# Patient Record
Sex: MALE | Race: BLACK OR AFRICAN AMERICAN | NOT HISPANIC OR LATINO | Employment: OTHER | URBAN - METROPOLITAN AREA
[De-identification: names, ages, dates, MRNs, and addresses within clinical notes are randomized per-mention and may not be internally consistent; named-entity substitution may affect disease eponyms.]

---

## 2017-04-08 ENCOUNTER — HOSPITAL ENCOUNTER (EMERGENCY)
Facility: HOSPITAL | Age: 37
Discharge: PSYCHIATRIC OR SUBSTANCE USE FACILITY | End: 2017-04-08
Attending: EMERGENCY MEDICINE
Payer: MEDICARE

## 2017-04-08 VITALS
BODY MASS INDEX: 27.2 KG/M2 | HEIGHT: 70 IN | RESPIRATION RATE: 18 BRPM | HEART RATE: 72 BPM | OXYGEN SATURATION: 99 % | TEMPERATURE: 99 F | DIASTOLIC BLOOD PRESSURE: 97 MMHG | SYSTOLIC BLOOD PRESSURE: 151 MMHG | WEIGHT: 190 LBS

## 2017-04-08 DIAGNOSIS — R45.89 SUICIDAL BEHAVIOR: Primary | ICD-10-CM

## 2017-04-08 LAB
ALBUMIN SERPL BCP-MCNC: 3.6 G/DL
ALP SERPL-CCNC: 125 U/L
ALT SERPL W/O P-5'-P-CCNC: 41 U/L
AMPHET+METHAMPHET UR QL: NEGATIVE
ANION GAP SERPL CALC-SCNC: 8 MMOL/L
APAP SERPL-MCNC: <3 UG/ML
AST SERPL-CCNC: 31 U/L
BARBITURATES UR QL SCN>200 NG/ML: NEGATIVE
BASOPHILS # BLD AUTO: 0.01 K/UL
BASOPHILS NFR BLD: 0.2 %
BENZODIAZ UR QL SCN>200 NG/ML: NEGATIVE
BILIRUB SERPL-MCNC: 0.4 MG/DL
BILIRUB UR QL STRIP: NEGATIVE
BUN SERPL-MCNC: 13 MG/DL
BZE UR QL SCN: NORMAL
CALCIUM SERPL-MCNC: 9 MG/DL
CANNABINOIDS UR QL SCN: NORMAL
CHLORIDE SERPL-SCNC: 106 MMOL/L
CLARITY UR REFRACT.AUTO: CLEAR
CO2 SERPL-SCNC: 26 MMOL/L
COLOR UR AUTO: YELLOW
CREAT SERPL-MCNC: 1.2 MG/DL
CREAT UR-MCNC: 153 MG/DL
DIFFERENTIAL METHOD: ABNORMAL
EOSINOPHIL # BLD AUTO: 0.2 K/UL
EOSINOPHIL NFR BLD: 2.5 %
ERYTHROCYTE [DISTWIDTH] IN BLOOD BY AUTOMATED COUNT: 14.3 %
EST. GFR  (AFRICAN AMERICAN): >60 ML/MIN/1.73 M^2
EST. GFR  (NON AFRICAN AMERICAN): >60 ML/MIN/1.73 M^2
ETHANOL SERPL-MCNC: <10 MG/DL
GLUCOSE SERPL-MCNC: 98 MG/DL
GLUCOSE UR QL STRIP: NEGATIVE
HCT VFR BLD AUTO: 33.6 %
HGB BLD-MCNC: 11 G/DL
HGB UR QL STRIP: NEGATIVE
KETONES UR QL STRIP: NEGATIVE
LEUKOCYTE ESTERASE UR QL STRIP: NEGATIVE
LYMPHOCYTES # BLD AUTO: 3.4 K/UL
LYMPHOCYTES NFR BLD: 58 %
MCH RBC QN AUTO: 26.6 PG
MCHC RBC AUTO-ENTMCNC: 32.7 %
MCV RBC AUTO: 81 FL
METHADONE UR QL SCN>300 NG/ML: NEGATIVE
MONOCYTES # BLD AUTO: 0.4 K/UL
MONOCYTES NFR BLD: 7.3 %
NEUTROPHILS # BLD AUTO: 1.9 K/UL
NEUTROPHILS NFR BLD: 32 %
NITRITE UR QL STRIP: NEGATIVE
OPIATES UR QL SCN: NEGATIVE
PCP UR QL SCN>25 NG/ML: NEGATIVE
PH UR STRIP: 6 [PH] (ref 5–8)
PLATELET # BLD AUTO: 169 K/UL
PLATELET BLD QL SMEAR: ABNORMAL
PMV BLD AUTO: 8.3 FL
POTASSIUM SERPL-SCNC: 3.4 MMOL/L
PROT SERPL-MCNC: 7.4 G/DL
PROT UR QL STRIP: NEGATIVE
RBC # BLD AUTO: 4.14 M/UL
SODIUM SERPL-SCNC: 140 MMOL/L
SP GR UR STRIP: 1.01 (ref 1–1.03)
T4 FREE SERPL-MCNC: 0.91 NG/DL
TOXICOLOGY INFORMATION: NORMAL
TSH SERPL DL<=0.005 MIU/L-ACNC: 7.48 UIU/ML
URN SPEC COLLECT METH UR: NORMAL
UROBILINOGEN UR STRIP-ACNC: NEGATIVE EU/DL
WBC # BLD AUTO: 5.93 K/UL

## 2017-04-08 PROCEDURE — 80307 DRUG TEST PRSMV CHEM ANLYZR: CPT

## 2017-04-08 PROCEDURE — 82570 ASSAY OF URINE CREATININE: CPT

## 2017-04-08 PROCEDURE — 84443 ASSAY THYROID STIM HORMONE: CPT

## 2017-04-08 PROCEDURE — 99285 EMERGENCY DEPT VISIT HI MDM: CPT | Mod: 25

## 2017-04-08 PROCEDURE — 81003 URINALYSIS AUTO W/O SCOPE: CPT

## 2017-04-08 PROCEDURE — 80329 ANALGESICS NON-OPIOID 1 OR 2: CPT

## 2017-04-08 PROCEDURE — 99285 EMERGENCY DEPT VISIT HI MDM: CPT | Mod: ,,, | Performed by: EMERGENCY MEDICINE

## 2017-04-08 PROCEDURE — 85025 COMPLETE CBC W/AUTO DIFF WBC: CPT

## 2017-04-08 PROCEDURE — 80053 COMPREHEN METABOLIC PANEL: CPT

## 2017-04-08 PROCEDURE — 84439 ASSAY OF FREE THYROXINE: CPT

## 2017-04-08 PROCEDURE — 93005 ELECTROCARDIOGRAM TRACING: CPT

## 2017-04-08 PROCEDURE — 93010 ELECTROCARDIOGRAM REPORT: CPT | Mod: ,,, | Performed by: INTERNAL MEDICINE

## 2017-04-08 PROCEDURE — 80320 DRUG SCREEN QUANTALCOHOLS: CPT

## 2017-04-08 RX ORDER — VALPROIC ACID 250 MG/5ML
SOLUTION ORAL 2 TIMES DAILY
Status: ON HOLD | COMMUNITY
End: 2017-12-21 | Stop reason: HOSPADM

## 2017-04-08 RX ORDER — BUDESONIDE AND FORMOTEROL FUMARATE DIHYDRATE 160; 4.5 UG/1; UG/1
2 AEROSOL RESPIRATORY (INHALATION) EVERY 12 HOURS
COMMUNITY

## 2017-04-08 RX ORDER — ESCITALOPRAM OXALATE 10 MG/1
10 TABLET ORAL 2 TIMES DAILY
Status: ON HOLD | COMMUNITY
End: 2017-12-21 | Stop reason: HOSPADM

## 2017-04-08 RX ORDER — FLUPHENAZINE HYDROCHLORIDE 5 MG/1
2.5 TABLET ORAL 2 TIMES DAILY
Status: ON HOLD | COMMUNITY
End: 2017-12-21 | Stop reason: HOSPADM

## 2017-04-08 RX ORDER — FLUPHENAZINE DECANOATE 25 MG/ML
INJECTION, SOLUTION INTRAMUSCULAR; SUBCUTANEOUS
Status: ON HOLD | COMMUNITY
End: 2017-12-21 | Stop reason: HOSPADM

## 2017-04-08 RX ORDER — DICYCLOMINE HYDROCHLORIDE 10 MG/1
10 CAPSULE ORAL 2 TIMES DAILY
Status: ON HOLD | COMMUNITY
End: 2017-12-21 | Stop reason: HOSPADM

## 2017-04-08 NOTE — ED NOTES
SPD here for patient transfer. Trip sheet filled out. Security called. Patient informed of transfer. Patient verbalized understanding.

## 2017-04-08 NOTE — ED NOTES
Pt identifiers Reji Michaels checked and correct. Sitter and security at bedside.   LOC: The patient is awake, alert, aware of environment with an appropriate affect. Oriented x3, speaking appropriately  APPEARANCE: Pt resting comfortably, in no acute distress, pt is clean and well groomed, clothing properly fastened  SKIN: Skin warm, dry and intact, normal skin turgor, moist mucus membranes  RESPIRATORY: Airway is open and patent, respirations are spontaneous, even and unlabored, normal effort and rate  CARDIAC: Normal rate and rhythm, no peripheral edema noted, capillary refill < 3 seconds, bilateral radial pulses 2+  ABDOMEN: Soft, nontender, nondistended. Bowel sounds present   NEUROLOGIC: PERRL, facial expression is symmetrical, patient moving all extremities spontaneously, normal sensation in all extremities when touched with a finger.  Follows all commands appropriately. Pt states he has been having auditory hallucinations, SI, and HI.  MUSCULOSKELETAL: No obvious deformities.

## 2017-04-08 NOTE — ED NOTES
Patient accepted to [facility] per [facility] intake.   Accepting physician Dr. Cortez   Report to be called [phone] ext. [phone] (4th floor)

## 2017-04-08 NOTE — ED PROVIDER NOTES
"Encounter Date: 4/8/2017    SCRIBE #1 NOTE: I, Romeo Hardy, am scribing for, and in the presence of,  Dr. Reeder. I have scribed the following portions of the note - the EKG reading.       History     Chief Complaint   Patient presents with    Suicidal     pt states he has schizophrenia and is hearing voices to hurt himself. pt is compliant with medications without any help.     Review of patient's allergies indicates:   Allergen Reactions    Shellfish containing products Itching and Swelling     HPI Comments: 37 y/o M with extensive Psych history with multiple inpatient stays presents with suicidal, homicidal ideation with auditory and visual hallucinations. Patient recently discharged from [facility], he maintains they changed his prescription and since that time has been unable to keep his eyes open and, "feels like he's driving a car in the dark." He reports hearing voices that tell him to kill himself and hurt other people. He is seeing black/gray lines on the wall. Reports that symptoms are different than prior to his last admission. Denies medication non-compliance but thinks he may have taken one of his medications twice today.     The history is provided by the patient.     Past Medical History:   Diagnosis Date    Anxiety     Asthma     Bipolar 1 disorder     Depression     Hep C w/o coma, chronic     HIV (human immunodeficiency virus infection)     HTN (hypertension)     Schizoaffective disorder, bipolar type      Past Surgical History:   Procedure Laterality Date    APPENDECTOMY      HERNIA REPAIR       Family History   Problem Relation Age of Onset    Hypertension Mother     Stroke Father     Hypertension Father      Social History   Substance Use Topics    Smoking status: Current Every Day Smoker     Packs/day: 1.00     Years: 15.00     Types: Cigarettes    Smokeless tobacco: Never Used    Alcohol use No     Review of Systems   Constitutional: Negative for appetite change, chills and " fatigue.   HENT: Negative for trouble swallowing and voice change.    Eyes: Negative for photophobia, pain and visual disturbance.   Respiratory: Negative for shortness of breath, wheezing and stridor.    Gastrointestinal: Negative for nausea and vomiting.   Neurological: Negative for dizziness, tremors, seizures, syncope, facial asymmetry, speech difficulty, weakness, light-headedness, numbness and headaches.   Psychiatric/Behavioral: Positive for hallucinations, self-injury, sleep disturbance and suicidal ideas. Negative for agitation, behavioral problems, confusion, decreased concentration and dysphoric mood. The patient is not nervous/anxious and is not hyperactive.        Physical Exam   Initial Vitals   BP Pulse Resp Temp SpO2   04/08/17 0121 04/08/17 0121 04/08/17 0121 04/08/17 0121 04/08/17 0121   161/89 75 16 98.5 °F (36.9 °C) 98 %     Physical Exam    Nursing note and vitals reviewed.  Constitutional: He appears well-developed and well-nourished. He is not diaphoretic. No distress.   HENT:   Head: Normocephalic.   Eyes: Pupils are equal, round, and reactive to light. Right eye exhibits nystagmus. Left eye exhibits nystagmus.   Horizontal nystagmus at rest.    Neck: Normal range of motion.   Cardiovascular: Normal rate, regular rhythm, normal heart sounds and intact distal pulses.   No murmur heard.  Pulmonary/Chest: Breath sounds normal. No respiratory distress. He has no wheezes. He has no rhonchi. He has no rales. He exhibits no tenderness.   Abdominal: Soft. Bowel sounds are normal. He exhibits no distension.   Neurological: He is alert. He has normal reflexes.   Skin: Skin is warm and dry. No rash and no abscess noted. No erythema. No pallor.   Psychiatric: His mood appears not anxious. His affect is not angry, not blunt, not labile and not inappropriate. His speech is slurred. His speech is not rapid and/or pressured and not delayed. He is actively hallucinating. He is not agitated, not aggressive,  not hyperactive, not slowed, not withdrawn and not combative. Thought content is not paranoid and not delusional. He does not exhibit a depressed mood. He expresses homicidal and suicidal ideation. He is communicative. He is attentive.         ED Course   Procedures  Labs Reviewed   COMPREHENSIVE METABOLIC PANEL   TSH   CBC W/ AUTO DIFFERENTIAL   URINALYSIS   DRUG SCREEN PANEL, URINE EMERGENCY   ALCOHOL,MEDICAL (ETHANOL)   ACETAMINOPHEN LEVEL     EKG Readings: (Independently Interpreted)   Initial Reading: No STEMI. Rhythm: Normal Sinus Rhythm.   NSR, no ST abnormalities, normal intervals.           Medical Decision Making:   History:   Old Medical Records: I decided to obtain old medical records.  Initial Assessment:   37 y/o M w/ extensive psych history presents with homicidal/suicidal ideation + auditory + visual hallucinations. Well, medically clear and transfer to [facility], no psych beds currently availab.e   Independently Interpreted Test(s):   I have ordered and independently interpreted EKG Reading(s) - see prior notes  Clinical Tests:   Lab Tests: Ordered and Reviewed  Medical Tests: Ordered and Reviewed            Scribe Attestation:   Scribe #1: I performed the above scribed service and the documentation accurately describes the services I performed. I attest to the accuracy of the note.    Attending Attestation:   Physician Attestation Statement for Resident:  As the supervising MD   Physician Attestation Statement: I have personally seen and examined this patient.   I agree with the above history. -: 35 yo m, psych hx, here with SI/HI  PEC  Medical clearance  Psych admit   As the supervising MD I agree with the above PE.    As the supervising MD I agree with the above treatment, course, plan, and disposition.  I have reviewed and agree with the residents interpretation of the following: lab data.  I have reviewed the following: old records at this facility.          Physician Attestation for  Scribe:  Physician Attestation Statement for Scribe #1: I, Dr. Reeder, reviewed documentation, as scribed by Romeo Hardy in my presence, and it is both accurate and complete.                 ED Course     4:37 AM  Pt medically cleared for psych admit  PEC done  Will transfer to inpatient psych facility when available    Clinical Impression:   The encounter diagnosis was Suicidal behavior.          Jaden Gary MD  Resident  04/08/17 0657       Judy Reeder MD  04/11/17 3382

## 2017-04-08 NOTE — ED NOTES
Actively seeking placement at this time. [facility] [place], [facility], [facility], [facility], and [facility].

## 2017-12-15 ENCOUNTER — HOSPITAL ENCOUNTER (EMERGENCY)
Facility: HOSPITAL | Age: 37
Discharge: PSYCHIATRIC OR SUBSTANCE USE FACILITY | End: 2017-12-15
Attending: FAMILY MEDICINE
Payer: MEDICARE

## 2017-12-15 ENCOUNTER — HOSPITAL ENCOUNTER (INPATIENT)
Facility: HOSPITAL | Age: 37
LOS: 6 days | Discharge: HOME | DRG: 885 | End: 2017-12-21
Attending: PSYCHIATRY & NEUROLOGY | Admitting: PSYCHIATRY & NEUROLOGY
Payer: MEDICARE

## 2017-12-15 VITALS
DIASTOLIC BLOOD PRESSURE: 75 MMHG | SYSTOLIC BLOOD PRESSURE: 120 MMHG | RESPIRATION RATE: 16 BRPM | BODY MASS INDEX: 24.52 KG/M2 | WEIGHT: 185 LBS | HEIGHT: 73 IN | HEART RATE: 75 BPM | OXYGEN SATURATION: 98 % | TEMPERATURE: 98 F

## 2017-12-15 DIAGNOSIS — F12.10 CANNABIS ABUSE: Chronic | ICD-10-CM

## 2017-12-15 DIAGNOSIS — F14.10 COCAINE ABUSE: ICD-10-CM

## 2017-12-15 DIAGNOSIS — F25.9 SCHIZOAFFECTIVE DISORDER, CHRONIC CONDITION WITH ACUTE EXACERBATION: Primary | Chronic | ICD-10-CM

## 2017-12-15 DIAGNOSIS — R44.3 HALLUCINATIONS: ICD-10-CM

## 2017-12-15 DIAGNOSIS — F31.12 BIPOLAR 1 DISORDER, MANIC, MODERATE: ICD-10-CM

## 2017-12-15 DIAGNOSIS — F25.9 SCHIZOPHRENIA, SCHIZOAFFECTIVE, CHRONIC: Chronic | ICD-10-CM

## 2017-12-15 DIAGNOSIS — B18.2 HEP C W/O COMA, CHRONIC: ICD-10-CM

## 2017-12-15 DIAGNOSIS — R45.850 HOMICIDAL IDEATION: ICD-10-CM

## 2017-12-15 DIAGNOSIS — Z72.0 TOBACCO USE: ICD-10-CM

## 2017-12-15 DIAGNOSIS — R06.2 WHEEZING: ICD-10-CM

## 2017-12-15 DIAGNOSIS — I10 HYPERTENSION, UNSPECIFIED TYPE: ICD-10-CM

## 2017-12-15 DIAGNOSIS — B20 HIV (HUMAN IMMUNODEFICIENCY VIRUS INFECTION): ICD-10-CM

## 2017-12-15 DIAGNOSIS — F20.0 PARANOID SCHIZOPHRENIA: Primary | ICD-10-CM

## 2017-12-15 LAB
ALBUMIN SERPL BCP-MCNC: 4.5 G/DL
ALP SERPL-CCNC: 107 U/L
ALT SERPL W/O P-5'-P-CCNC: 76 U/L
AMPHET+METHAMPHET UR QL: NEGATIVE
ANION GAP SERPL CALC-SCNC: 12 MMOL/L
APAP SERPL-MCNC: <10 UG/ML
AST SERPL-CCNC: 57 U/L
BARBITURATES UR QL SCN>200 NG/ML: NEGATIVE
BASOPHILS # BLD AUTO: 0.02 K/UL
BASOPHILS NFR BLD: 0.5 %
BENZODIAZ UR QL SCN>200 NG/ML: NEGATIVE
BILIRUB SERPL-MCNC: 0.3 MG/DL
BILIRUB UR QL STRIP: NEGATIVE
BUN SERPL-MCNC: 20 MG/DL
BZE UR QL SCN: NEGATIVE
CALCIUM SERPL-MCNC: 9.2 MG/DL
CANNABINOIDS UR QL SCN: NORMAL
CHLORIDE SERPL-SCNC: 102 MMOL/L
CLARITY UR REFRACT.AUTO: CLEAR
CO2 SERPL-SCNC: 27 MMOL/L
COLOR UR AUTO: YELLOW
CREAT SERPL-MCNC: 1.1 MG/DL
CREAT UR-MCNC: 126.8 MG/DL
DIFFERENTIAL METHOD: ABNORMAL
EOSINOPHIL # BLD AUTO: 0.1 K/UL
EOSINOPHIL NFR BLD: 2.7 %
ERYTHROCYTE [DISTWIDTH] IN BLOOD BY AUTOMATED COUNT: 13.6 %
EST. GFR  (AFRICAN AMERICAN): >60 ML/MIN/1.73 M^2
EST. GFR  (NON AFRICAN AMERICAN): >60 ML/MIN/1.73 M^2
ETHANOL SERPL-MCNC: <10 MG/DL
GLUCOSE SERPL-MCNC: 126 MG/DL
GLUCOSE UR QL STRIP: NEGATIVE
HCT VFR BLD AUTO: 34.7 %
HGB BLD-MCNC: 11.8 G/DL
HGB UR QL STRIP: NEGATIVE
KETONES UR QL STRIP: NEGATIVE
LEUKOCYTE ESTERASE UR QL STRIP: NEGATIVE
LYMPHOCYTES # BLD AUTO: 2.8 K/UL
LYMPHOCYTES NFR BLD: 62.4 %
MCH RBC QN AUTO: 27.1 PG
MCHC RBC AUTO-ENTMCNC: 34 G/DL
MCV RBC AUTO: 80 FL
METHADONE UR QL SCN>300 NG/ML: NEGATIVE
MONOCYTES # BLD AUTO: 0.3 K/UL
MONOCYTES NFR BLD: 7.7 %
NEUTROPHILS # BLD AUTO: 1.2 K/UL
NEUTROPHILS NFR BLD: 26.7 %
NITRITE UR QL STRIP: NEGATIVE
OPIATES UR QL SCN: NEGATIVE
PCP UR QL SCN>25 NG/ML: NEGATIVE
PH UR STRIP: 7 [PH] (ref 5–8)
PLATELET # BLD AUTO: 220 K/UL
PMV BLD AUTO: 8.6 FL
POTASSIUM SERPL-SCNC: 3.5 MMOL/L
PROT SERPL-MCNC: 8.2 G/DL
PROT UR QL STRIP: NEGATIVE
RBC # BLD AUTO: 4.35 M/UL
SODIUM SERPL-SCNC: 141 MMOL/L
SP GR UR STRIP: 1.01 (ref 1–1.03)
TOXICOLOGY INFORMATION: NORMAL
URN SPEC COLLECT METH UR: NORMAL
UROBILINOGEN UR STRIP-ACNC: 1 EU/DL
WBC # BLD AUTO: 4.41 K/UL

## 2017-12-15 PROCEDURE — 80053 COMPREHEN METABOLIC PANEL: CPT

## 2017-12-15 PROCEDURE — 25000003 PHARM REV CODE 250: Performed by: FAMILY MEDICINE

## 2017-12-15 PROCEDURE — G0425 INPT/ED TELECONSULT30: HCPCS | Mod: GT,,, | Performed by: PSYCHIATRY & NEUROLOGY

## 2017-12-15 PROCEDURE — 12400001 HC PSYCH SEMI-PRIVATE ROOM

## 2017-12-15 PROCEDURE — 85025 COMPLETE CBC W/AUTO DIFF WBC: CPT

## 2017-12-15 PROCEDURE — 99285 EMERGENCY DEPT VISIT HI MDM: CPT

## 2017-12-15 PROCEDURE — 81003 URINALYSIS AUTO W/O SCOPE: CPT

## 2017-12-15 PROCEDURE — 80329 ANALGESICS NON-OPIOID 1 OR 2: CPT

## 2017-12-15 PROCEDURE — 80320 DRUG SCREEN QUANTALCOHOLS: CPT

## 2017-12-15 PROCEDURE — 80307 DRUG TEST PRSMV CHEM ANLYZR: CPT

## 2017-12-15 RX ORDER — POLYETHYLENE GLYCOL 3350 17 G/17G
17 POWDER, FOR SOLUTION ORAL DAILY
Status: DISCONTINUED | OUTPATIENT
Start: 2017-12-16 | End: 2017-12-19

## 2017-12-15 RX ORDER — LOSARTAN POTASSIUM 25 MG/1
50 TABLET ORAL DAILY
Status: DISCONTINUED | OUTPATIENT
Start: 2017-12-16 | End: 2017-12-21 | Stop reason: HOSPADM

## 2017-12-15 RX ORDER — RISPERIDONE 1 MG/1
1 TABLET ORAL 2 TIMES DAILY
Status: DISCONTINUED | OUTPATIENT
Start: 2017-12-15 | End: 2017-12-15 | Stop reason: HOSPADM

## 2017-12-15 RX ORDER — HALOPERIDOL 5 MG/1
5 TABLET ORAL EVERY 4 HOURS PRN
Status: DISCONTINUED | OUTPATIENT
Start: 2017-12-15 | End: 2017-12-21 | Stop reason: HOSPADM

## 2017-12-15 RX ORDER — ALPRAZOLAM 1 MG/1
1 TABLET ORAL
Status: COMPLETED | OUTPATIENT
Start: 2017-12-15 | End: 2017-12-15

## 2017-12-15 RX ORDER — EFAVIRENZ, EMTRICITABINE AND TENOFOVIR DISOPROXIL FUMARATE 600; 200; 300 MG/1; MG/1; MG/1
1 TABLET, FILM COATED ORAL NIGHTLY
Status: DISCONTINUED | OUTPATIENT
Start: 2017-12-15 | End: 2017-12-16

## 2017-12-15 RX ORDER — DOCUSATE SODIUM 100 MG/1
100 CAPSULE, LIQUID FILLED ORAL DAILY PRN
Status: DISCONTINUED | OUTPATIENT
Start: 2017-12-15 | End: 2017-12-21 | Stop reason: HOSPADM

## 2017-12-15 RX ORDER — FLUTICASONE PROPIONATE 50 MCG
1 SPRAY, SUSPENSION (ML) NASAL DAILY
Status: DISCONTINUED | OUTPATIENT
Start: 2017-12-16 | End: 2017-12-21 | Stop reason: HOSPADM

## 2017-12-15 RX ORDER — DIPHENHYDRAMINE HCL 50 MG
50 CAPSULE ORAL EVERY 4 HOURS PRN
Status: DISCONTINUED | OUTPATIENT
Start: 2017-12-15 | End: 2017-12-21 | Stop reason: HOSPADM

## 2017-12-15 RX ORDER — ALBUTEROL SULFATE 90 UG/1
2 AEROSOL, METERED RESPIRATORY (INHALATION) EVERY 4 HOURS PRN
Status: DISCONTINUED | OUTPATIENT
Start: 2017-12-15 | End: 2017-12-21 | Stop reason: HOSPADM

## 2017-12-15 RX ORDER — CALCIUM CARBONATE 200(500)MG
1000 TABLET,CHEWABLE ORAL EVERY 8 HOURS PRN
Status: DISCONTINUED | OUTPATIENT
Start: 2017-12-15 | End: 2017-12-21 | Stop reason: HOSPADM

## 2017-12-15 RX ORDER — HYDROXYZINE PAMOATE 50 MG/1
50 CAPSULE ORAL NIGHTLY PRN
Status: DISCONTINUED | OUTPATIENT
Start: 2017-12-15 | End: 2017-12-21 | Stop reason: HOSPADM

## 2017-12-15 RX ORDER — FOLIC ACID 1 MG/1
1 TABLET ORAL DAILY
Status: DISCONTINUED | OUTPATIENT
Start: 2017-12-16 | End: 2017-12-21 | Stop reason: HOSPADM

## 2017-12-15 RX ORDER — PALIPERIDONE 6 MG/1
3 TABLET, EXTENDED RELEASE ORAL DAILY
Status: ON HOLD | COMMUNITY
End: 2017-12-21 | Stop reason: HOSPADM

## 2017-12-15 RX ORDER — DIPHENHYDRAMINE HYDROCHLORIDE 50 MG/ML
50 INJECTION INTRAMUSCULAR; INTRAVENOUS EVERY 4 HOURS PRN
Status: DISCONTINUED | OUTPATIENT
Start: 2017-12-15 | End: 2017-12-21 | Stop reason: HOSPADM

## 2017-12-15 RX ORDER — HALOPERIDOL 5 MG/ML
5 INJECTION INTRAMUSCULAR EVERY 4 HOURS PRN
Status: DISCONTINUED | OUTPATIENT
Start: 2017-12-15 | End: 2017-12-21 | Stop reason: HOSPADM

## 2017-12-15 RX ORDER — PANTOPRAZOLE SODIUM 40 MG/1
40 TABLET, DELAYED RELEASE ORAL DAILY
Status: DISCONTINUED | OUTPATIENT
Start: 2017-12-16 | End: 2017-12-21 | Stop reason: HOSPADM

## 2017-12-15 RX ORDER — LORAZEPAM 1 MG/1
2 TABLET ORAL EVERY 4 HOURS PRN
Status: DISCONTINUED | OUTPATIENT
Start: 2017-12-15 | End: 2017-12-21 | Stop reason: HOSPADM

## 2017-12-15 RX ORDER — ACETAMINOPHEN 325 MG/1
650 TABLET ORAL EVERY 6 HOURS PRN
Status: DISCONTINUED | OUTPATIENT
Start: 2017-12-15 | End: 2017-12-21 | Stop reason: HOSPADM

## 2017-12-15 RX ORDER — FLUTICASONE FUROATE AND VILANTEROL 100; 25 UG/1; UG/1
1 POWDER RESPIRATORY (INHALATION) DAILY
Status: DISCONTINUED | OUTPATIENT
Start: 2017-12-16 | End: 2017-12-21 | Stop reason: HOSPADM

## 2017-12-15 RX ADMIN — ALPRAZOLAM 1 MG: 1 TABLET ORAL at 06:12

## 2017-12-15 NOTE — ED PROVIDER NOTES
"Encounter Date: 12/15/2017       History     Chief Complaint   Patient presents with    Psychiatric Evaluation     Pt states was on transport van on way home from [facility] outpatient therapy when he started hearing voices, states the voices told him "to hurt somebody", states "I live with that person and I don't want to go to long term so I need to be PEC'd."  Pt denies SI.  States has had inpatient treatment "in and out since 2008"  states last in patient treatment was 4/17.  pt calm and cooperative at this time.      Patient claims that he is hearing voices to kill his stepdad.  He lives with his stepsister and landed her $600 to buy a new car.  His step father wanted him to leave the house.  He wanted his sister to give back his money, as she did not give he is threatening to kill his stepdad.  [facility] after [facility] and decided to [facility] to [facility] but is sent to ER for evaluation.  Patient has history of bipolar, schizophrenia, depression, anxiety.      The history is provided by the patient.     Review of patient's allergies indicates:   Allergen Reactions    Shellfish containing products Itching and Swelling     Past Medical History:   Diagnosis Date    Anxiety     Asthma     Bipolar 1 disorder     Depression     Hep C w/o coma, chronic     HIV (human immunodeficiency virus infection)     HTN (hypertension)     Schizoaffective disorder, bipolar type      Past Surgical History:   Procedure Laterality Date    APPENDECTOMY      HERNIA REPAIR       Family History   Problem Relation Age of Onset    Hypertension Mother     Stroke Father     Hypertension Father      Social History   Substance Use Topics    Smoking status: Current Every Day Smoker     Packs/day: 1.00     Years: 15.00     Types: Cigarettes    Smokeless tobacco: Never Used    Alcohol use No     Review of Systems   Constitutional: Negative for activity change, appetite change and fever. "   HENT: Negative for congestion, ear discharge, ear pain, rhinorrhea, sinus pain and sore throat.    Eyes: Negative for pain, discharge, redness and itching.   Respiratory: Negative for cough, chest tightness, shortness of breath and wheezing.    Cardiovascular: Negative for chest pain.   Gastrointestinal: Negative for abdominal distention, abdominal pain, diarrhea and vomiting.   Genitourinary: Negative for dysuria, flank pain and frequency.   Musculoskeletal: Negative for back pain, neck pain and neck stiffness.   Skin: Negative for rash.   Neurological: Negative for dizziness, tremors, syncope, speech difficulty, weakness, light-headedness and headaches.   Psychiatric/Behavioral: Positive for behavioral problems and hallucinations. Negative for confusion and decreased concentration. The patient is nervous/anxious.    All other systems reviewed and are negative.      Physical Exam     Initial Vitals [12/15/17 1640]   BP Pulse Resp Temp SpO2   131/71 85 18 98.7 °F (37.1 °C) 98 %      MAP       91         Physical Exam    Nursing note and vitals reviewed.  Constitutional: He appears well-developed and well-nourished.   HENT:   Head: Normocephalic.   Right Ear: External ear normal.   Left Ear: External ear normal.   Nose: Nose normal.   Mouth/Throat: Oropharynx is clear and moist.   Eyes: Conjunctivae and EOM are normal. Pupils are equal, round, and reactive to light.   Neck: Normal range of motion. Neck supple.   Cardiovascular: Normal rate, regular rhythm, normal heart sounds and intact distal pulses.   Pulmonary/Chest: Breath sounds normal. No respiratory distress. He has no wheezes. He has no rhonchi. He has no rales. He exhibits no tenderness.   Abdominal: Soft. Bowel sounds are normal. He exhibits no distension. There is no tenderness. There is no guarding.   Musculoskeletal: Normal range of motion.   Neurological: He is alert and oriented to person, place, and time. He has normal strength and normal reflexes.  He displays normal reflexes. No cranial nerve deficit.   Skin: Skin is warm. Capillary refill takes less than 2 seconds. No rash noted. No erythema.   Psychiatric: His mood appears anxious. His affect is labile. His speech is rapid and/or pressured. He is aggressive. Thought content is paranoid. Cognition and memory are normal. He expresses impulsivity. He expresses homicidal ideation. He expresses homicidal plans.         ED Course   Procedures  Labs Reviewed   CBC W/ AUTO DIFFERENTIAL   COMPREHENSIVE METABOLIC PANEL   URINALYSIS   DRUG SCREEN PANEL, URINE EMERGENCY   ALCOHOL,MEDICAL (ETHANOL)   ACETAMINOPHEN LEVEL                               ED Course     patient is medically cleared for psych inpatient placement.  Clinical Impression:   The primary encounter diagnosis was Paranoid schizophrenia. Diagnoses of Homicidal ideation and Bipolar 1 disorder, manic, moderate were also pertinent to this visit.                           Uche Evans MD  12/15/17 1800

## 2017-12-16 PROBLEM — F12.10 CANNABIS ABUSE: Chronic | Status: ACTIVE | Noted: 2017-12-16

## 2017-12-16 PROBLEM — Z72.811: Status: ACTIVE | Noted: 2017-12-16

## 2017-12-16 PROBLEM — Z72.811: Status: RESOLVED | Noted: 2017-12-16 | Resolved: 2017-12-16

## 2017-12-16 PROBLEM — R44.3 HALLUCINATIONS: Status: RESOLVED | Noted: 2017-12-15 | Resolved: 2017-12-16

## 2017-12-16 LAB
CHOLEST SERPL-MCNC: 154 MG/DL
CHOLEST/HDLC SERPL: 4.3 {RATIO}
ESTIMATED AVG GLUCOSE: 105 MG/DL
HBA1C MFR BLD HPLC: 5.3 %
HDLC SERPL-MCNC: 36 MG/DL
HDLC SERPL: 23.4 %
LDLC SERPL CALC-MCNC: 62 MG/DL
NONHDLC SERPL-MCNC: 118 MG/DL
RPR SER QL: NORMAL
TRIGL SERPL-MCNC: 280 MG/DL

## 2017-12-16 PROCEDURE — 87591 N.GONORRHOEAE DNA AMP PROB: CPT

## 2017-12-16 PROCEDURE — 12400001 HC PSYCH SEMI-PRIVATE ROOM

## 2017-12-16 PROCEDURE — 80074 ACUTE HEPATITIS PANEL: CPT

## 2017-12-16 PROCEDURE — 80061 LIPID PANEL: CPT

## 2017-12-16 PROCEDURE — 25000003 PHARM REV CODE 250: Performed by: PSYCHIATRY & NEUROLOGY

## 2017-12-16 PROCEDURE — 25000242 PHARM REV CODE 250 ALT 637 W/ HCPCS: Performed by: PSYCHIATRY & NEUROLOGY

## 2017-12-16 PROCEDURE — 36415 COLL VENOUS BLD VENIPUNCTURE: CPT

## 2017-12-16 PROCEDURE — 83036 HEMOGLOBIN GLYCOSYLATED A1C: CPT

## 2017-12-16 PROCEDURE — 99223 1ST HOSP IP/OBS HIGH 75: CPT | Mod: AI,,, | Performed by: PSYCHIATRY & NEUROLOGY

## 2017-12-16 PROCEDURE — 86592 SYPHILIS TEST NON-TREP QUAL: CPT

## 2017-12-16 RX ORDER — PAROXETINE HYDROCHLORIDE 20 MG/1
20 TABLET, FILM COATED ORAL DAILY
Status: DISCONTINUED | OUTPATIENT
Start: 2017-12-16 | End: 2017-12-16

## 2017-12-16 RX ORDER — LORAZEPAM 1 MG/1
2 TABLET ORAL EVERY 4 HOURS PRN
Status: DISCONTINUED | OUTPATIENT
Start: 2017-12-16 | End: 2017-12-16

## 2017-12-16 RX ORDER — ALPRAZOLAM 1 MG/1
1 TABLET ORAL 3 TIMES DAILY
Status: DISCONTINUED | OUTPATIENT
Start: 2017-12-16 | End: 2017-12-18

## 2017-12-16 RX ORDER — PALIPERIDONE 6 MG/1
6 TABLET, EXTENDED RELEASE ORAL DAILY
Status: DISCONTINUED | OUTPATIENT
Start: 2017-12-16 | End: 2017-12-21 | Stop reason: HOSPADM

## 2017-12-16 RX ORDER — QUETIAPINE FUMARATE 100 MG/1
100 TABLET, FILM COATED ORAL ONCE
Status: COMPLETED | OUTPATIENT
Start: 2017-12-16 | End: 2017-12-16

## 2017-12-16 RX ORDER — PAROXETINE 30 MG/1
30 TABLET, FILM COATED ORAL DAILY
Status: DISCONTINUED | OUTPATIENT
Start: 2017-12-16 | End: 2017-12-21 | Stop reason: HOSPADM

## 2017-12-16 RX ORDER — QUETIAPINE FUMARATE 100 MG/1
100 TABLET, FILM COATED ORAL NIGHTLY
Status: DISCONTINUED | OUTPATIENT
Start: 2017-12-16 | End: 2017-12-16

## 2017-12-16 RX ORDER — QUETIAPINE FUMARATE 100 MG/1
100 TABLET, FILM COATED ORAL NIGHTLY
Status: DISCONTINUED | OUTPATIENT
Start: 2017-12-17 | End: 2017-12-16

## 2017-12-16 RX ADMIN — FLUTICASONE PROPIONATE 1 SPRAY: 50 SPRAY, METERED NASAL at 08:12

## 2017-12-16 RX ADMIN — CALCIUM CARBONATE (ANTACID) CHEW TAB 500 MG 1000 MG: 500 CHEW TAB at 03:12

## 2017-12-16 RX ADMIN — LOSARTAN POTASSIUM 50 MG: 25 TABLET, FILM COATED ORAL at 08:12

## 2017-12-16 RX ADMIN — THERA TABS 1 TABLET: TAB at 08:12

## 2017-12-16 RX ADMIN — ALPRAZOLAM 1 MG: 1 TABLET ORAL at 11:12

## 2017-12-16 RX ADMIN — QUETIAPINE FUMARATE 100 MG: 100 TABLET, FILM COATED ORAL at 03:12

## 2017-12-16 RX ADMIN — PALIPERIDONE 6 MG: 6 TABLET, EXTENDED RELEASE ORAL at 06:12

## 2017-12-16 RX ADMIN — FLUTICASONE FUROATE AND VILANTEROL TRIFENATATE 1 PUFF: 100; 25 POWDER RESPIRATORY (INHALATION) at 08:12

## 2017-12-16 RX ADMIN — FOLIC ACID 1 MG: 1 TABLET ORAL at 08:12

## 2017-12-16 RX ADMIN — PAROXETINE HYDROCHLORIDE HEMIHYDRATE 30 MG: 30 TABLET, FILM COATED ORAL at 08:12

## 2017-12-16 RX ADMIN — PANTOPRAZOLE SODIUM 40 MG: 40 TABLET, DELAYED RELEASE ORAL at 08:12

## 2017-12-16 RX ADMIN — ALPRAZOLAM 1 MG: 1 TABLET ORAL at 08:12

## 2017-12-16 NOTE — ASSESSMENT & PLAN NOTE
Reports Compliance with Invega Sustenna 156 mg IM last received 12/3  Reports AH despite no objective signs of psychosis  R/O Borderline Personality Disorder as clouding diagnosis  Seroquel 100 mg qhs started 12/16 for mood and sleep   Continue Paxil 30 mg daily   Haldol + Ativan PRN non redirectable agitation  Lipid panel + HbA1c pending

## 2017-12-16 NOTE — PLAN OF CARE
Problem: Patient Care Overview  Goal: Plan of Care Review  Outcome: Ongoing (interventions implemented as appropriate)  Please see previous note for further information. Patient has been sleeping since admit. No management issues. Environmental and safety rounds performed. Will continue to monitor.

## 2017-12-16 NOTE — HOSPITAL COURSE
"12/17/17 - patient irritable, paranoid but so far under good behavioral control.  Continues to voice SI/HI towards family.  Compliant with med regimen.  12/18/2017 - The pt describes his mood as "not good." He notes persistent homicidal and suicidal thoughts. He states that he wants to "kick their ass" of his step-father and step-sister (Kyle Nair and Andreas Nair.) We discuss the pt's h/o substance abuse, he describes himself as a polysubstance abuser with no DOC because "I'd use them all." The pt notes active paranoia and fear that people are "out to get me." He adds that he does not feel safe on the unit. Pt is reassured that he is on a safe unit.   12/19/2017 - The pt notes that he began to feel depressed yesterday and his SI increased. The pt notes that he continues to to have thoughts of harming his family. He notes "If I get out of here I'm gonna kick their ass." He notes that he understands that if he does hurt his family he will go to [facility].  Per staff he is solicitous of xanax - also with treatment team this AM.  12/20/2017 - The pt notes that he spoke to his sister Andreas and that he wants to go home for the holidays. He formerly wanted to harm his sister and now states that he gets "rebelious" when he doesn't get his way. He notes that she wouldn't take him to somewhere he wanted to go and he got angry and wanted to harm her and her dad. He now states that he has no desire to harm them. He adds that he was hallucinating formerly and now is no longer hearing voices. He notes that these voices "came all of a sudden and stopped all of a sudden."   12/21/2017 - The pt notes that he is no longer suffering from SI, HI, AVH or paranoia. He continues to note that his symptoms disappear and re-appear. He is aware that his sister is concerned that he was formerly threatening suicide and homicide toward her and her father.   Sister Andreas [phone]- Spoke with patient since our last conversation. She " reports that the patient is safe to return home to stay with her. Her father was also made aware of some of the comments that the patient made and they do not feel like he is a danger to them or himself at this time. The patient will need to have transportation to her home as she still does not have a car to come to the hospital.

## 2017-12-16 NOTE — PSYCH
Patient escorted to the unit via security at approximately 2200. Patient searched for contraband, none noted. Personal belongings searched and placed in patient specific locker; patient signature obtained for belongings inventory. VSS. JAVED called into . Patient appropriate during conversation. Appeared slightly distracted but did not appear to be responding to internal stimuli. He does endorse hearing voices that are telling him to kill his younger sister and step-father, and then to kill himself. He stated that while he himself does not have these feelings, that when he is hearing voices he is unable to control himself. Patient also stated that he does not think his current dose of invega is working and that is primary concern is staying on his xanax and increasing his invega injection. Patient oriented to room and unit. Admit paper work signed and questions answered. Currently patient is sleeping in his bed. Will continue to monitor.

## 2017-12-16 NOTE — SUBJECTIVE & OBJECTIVE
Patient History           Medical as of 12/16/2017     Past Medical History     Diagnosis Date Comments Source    Anxiety -- -- Provider    Asthma -- -- Provider    Bipolar 1 disorder -- -- Provider    Depression -- -- Provider    Hep C w/o coma, chronic -- -- Provider    HIV (human immunodeficiency virus infection) -- -- Provider    HTN (hypertension) -- -- Provider    Schizoaffective disorder, bipolar type -- -- Provider                  Surgical as of 12/16/2017     Past Surgical History     Procedure Laterality Date Comments Source    APPENDECTOMY -- -- -- Provider    HERNIA REPAIR -- -- -- Provider                  Family as of 12/16/2017     Problem Relation Name Age of Onset Comments Source    Hypertension Mother -- -- -- Provider    Stroke Father -- -- -- Provider    Hypertension Father -- -- -- Provider            Tobacco Use as of 12/16/2017     Smoking Status Smoking Start Date Smoking Quit Date Packs/day Years Used    Current Every Day Smoker -- -- 1.00 15.00    Types Comments Smokeless Tobacco Status Smokeless Tobacco Quit Date Source     Cigarettes -- Never Used -- Provider            Alcohol Use as of 12/16/2017     Alcohol Use Drinks/Week Alcohol/Week Comments Source    No -- -- -- Provider            Drug Use as of 12/16/2017     Drug Use Types Frequency Comments Source    Yes  Marijuana -- -- Provider            Sexual Activity as of 12/16/2017     Sexually Active Birth Control Partners Comments Source    Not Asked -- -- -- Provider            Activities of Daily Living as of 12/16/2017    **None**           Social Documentation as of 12/16/2017    **None**           Occupational as of 12/16/2017    **None**           Socioeconomic as of 12/16/2017     Marital Status Spouse Name Number of Children Years Education Preferred Language Ethnicity Race Source    Single -- -- -- English /Black Black or  --         Pertinent History Q A Comments    as of 12/16/2017  Lives with      Place in Birth Order      Lives in      Number of Siblings      Raised by      Legal Involvement      Childhood Trauma      Criminal History of      Financial Status      Highest Level of Education      Does patient have access to a firearm?       Service      Primary Leisure Activity      Spirituality       Past Medical History:   Diagnosis Date    Anxiety     Asthma     Bipolar 1 disorder     Depression     Hep C w/o coma, chronic     HIV (human immunodeficiency virus infection)     HTN (hypertension)     Schizoaffective disorder, bipolar type      Past Surgical History:   Procedure Laterality Date    APPENDECTOMY      HERNIA REPAIR       Family History     Problem Relation (Age of Onset)    Hypertension Mother, Father    Stroke Father        Social History Main Topics    Smoking status: Current Every Day Smoker     Packs/day: 1.00     Years: 15.00     Types: Cigarettes    Smokeless tobacco: Never Used    Alcohol use No    Drug use: Yes     Types: Marijuana    Sexual activity: Not on file     Review of patient's allergies indicates:   Allergen Reactions    Shellfish containing products Itching and Swelling       Current Facility-Administered Medications on File Prior to Encounter   Medication    [COMPLETED] ALPRAZolam tablet 1 mg    [DISCONTINUED] risperiDONE tablet 1 mg     Current Outpatient Prescriptions on File Prior to Encounter   Medication Sig    albuterol (PROAIR HFA) 90 mcg/actuation inhaler Inhale 2 puffs into the lungs every 4 (four) hours as needed for Wheezing.    alprazolam (XANAX) 1 MG tablet Take 1 mg by mouth 2 (two) times daily.    ATRIPLA 600-200-300 mg Tab Take 1 tablet by mouth every evening.     budesonide-formoterol 160-4.5 mcg (SYMBICORT) 160-4.5 mcg/actuation HFAA Inhale 2 puffs into the lungs every 12 (twelve) hours. Controller    dicyclomine (BENTYL) 10 MG capsule Take 10 mg by mouth 2 (two) times daily.    elviteg-cob-emtri-tenof ALAFEN  (GENVOYA) [phone] mg Tab Take by mouth.    escitalopram oxalate (LEXAPRO) 10 MG tablet Take 10 mg by mouth 2 (two) times daily.    esomeprazole (NEXIUM) 40 MG capsule Take 40 mg by mouth before breakfast.    fluphenazine (PROLIXIN) 5 MG tablet Take 2.5 mg by mouth 2 (two) times daily.    fluphenazine decanoate (PROLIXIN) 25 mg/mL injection Inject into the muscle every 14 (fourteen) days.    gabapentin (NEURONTIN) 600 MG tablet Take 600 mg by mouth 2 (two) times daily.     losartan (COZAAR) 50 MG tablet Take 50 mg by mouth once daily.     NASONEX 50 mcg/actuation nasal spray 2 sprays by Nasal route 4 (four) times daily as needed.     oxycodone-acetaminophen (PERCOCET) 5-325 mg per tablet Take 1 tablet by mouth every 8 (eight) hours as needed for Pain.    paliperidone (INVEGA) 6 MG TR24 Take 3 mg by mouth once daily.    paroxetine (PAXIL) 30 MG tablet Take 30 mg by mouth every morning.    promethazine (PHENERGAN) 25 MG tablet Take 25 mg by mouth every 6 (six) hours as needed.     valproate (DEPAKENE) 250 mg/5 mL syrup Take by mouth 2 (two) times daily.     Psychotherapeutics     Start     Stop Route Frequency Ordered    12/17/17 2100  QUEtiapine tablet 100 mg      -- Oral Nightly 12/16/17 0351    12/16/17 0442  LORazepam tablet 2 mg      -- Oral Every 4 hours PRN 12/16/17 0343 12/16/17 0342  paroxetine tablet 20 mg      -- Oral Daily 12/16/17 0342    12/15/17 2313  haloperidol tablet 5 mg  (Med - Acute  Behavioral Management)      -- Oral Every 4 hours PRN 12/15/17 2225    12/15/17 2313  LORazepam tablet 2 mg  (Med - Acute  Behavioral Management)      -- Oral Every 4 hours PRN 12/15/17 2225    12/15/17 2313  haloperidol lactate injection 5 mg  (Med - Acute  Behavioral Management)      -- IM Every 4 hours PRN 12/15/17 2225    12/15/17 2313  lorazepam (ATIVAN) injection 2 mg  (Med - Acute  Behavioral Management)      -- IM Every 4 hours PRN 12/15/17 2229        Review of Systems  Strengths and  Liabilities: Liability: Patient is impulsive., Liability: Patient has poor judgment    Objective:     Vital Signs (Most Recent):  Temp: 98 °F (36.7 °C) (12/15/17 2200)  Pulse: 61 (12/15/17 2200)  Resp: 16 (12/15/17 2200)  BP: (!) 147/95 (12/15/17 2200) Vital Signs (24h Range):  Temp:  [97.9 °F (36.6 °C)-98.7 °F (37.1 °C)] 98 °F (36.7 °C)  Pulse:  [61-85] 61  Resp:  [16-18] 16  SpO2:  [98 %-99 %] 98 %  BP: (120-147)/(71-95) 147/95     Height: 6' (182.9 cm)  Weight: 81.6 kg (179 lb 14.3 oz)  Body mass index is 24.4 kg/m².    No intake or output data in the 24 hours ending 12/16/17 0404    Physical Exam   Eyes: EOM are normal.     NEUROLOGICAL EXAMINATION:     CRANIAL NERVES   Cranial nerves II through XII intact.     CN II   Visual fields full to confrontation.   Visual acuity: normal  Right visual field deficit: none  Left visual field deficit: none     CN III, IV, VI   Extraocular motions are normal.   Right pupil: Shape: regular. Reactivity: brisk. Accommodation: intact.   Left pupil: Shape: regular. Reactivity: brisk. Accommodation: intact.   CN III: no CN III palsy  CN VI: no CN VI palsy  Nystagmus: none   Diplopia: none  Ophthalmoparesis: none  Upgaze: normal  Downgaze: normal    CN V   Facial sensation intact.   Right facial sensation deficit: none  Left facial sensation deficit: none  Right corneal reflex: normal  Left corneal reflex: normal    CN VII   Facial expression full, symmetric.   Right facial weakness: none  Left facial weakness: none    CN VIII   CN VIII normal.     CN IX, X   CN IX normal.   CN X normal.   Palate: symmetric  Right gag reflex: normal  Left gag reflex: normal    CN XI   CN XI normal.   Right sternocleidomastoid strength: normal  Left sternocleidomastoid strength: normal  Right trapezius strength: normal  Left trapezius strength: normal    CN XII   CN XII normal.   Tongue: not atrophic  Fasciculations: absent  Tongue deviation: none       CN I: Reports sense of smell intact            Significant Labs:   Last 24 Hours:   Recent Lab Results       12/15/17  1841 12/15/17  1705      Benzodiazepines Negative      Methadone metabolites Negative      Phencyclidine Negative      Acetaminophen (Tylenol), Serum  <10.0  Comment:  Toxic Levels:  Adults (4 hr post-ingestion).........>150 ug/mL  Adults (12 hr post-ingestion)........>40 ug/mL  Peds (2 hr post-ingestion, liquid)...>225 ug/mL       Albumin  4.5     Alcohol, Medical, Serum  <10     Alkaline Phosphatase  107     ALT  76(H)     Amphetamine Screen, Ur Negative      Anion Gap  12     Appearance, UA Clear      AST  57(H)     Barbiturate Screen, Ur Negative      Baso #  0.02     Basophil%  0.5     Bilirubin (UA) Negative      Total Bilirubin  0.3  Comment:  For infants and newborns, interpretation of results should be based  on gestational age, weight and in agreement with clinical  observations.  Premature Infant recommended reference ranges:  Up to 24 hours.............<8.0 mg/dL  Up to 48 hours............<12.0 mg/dL  3-5 days..................<15.0 mg/dL  6-29 days.................<15.0 mg/dL       BUN, Bld  20     Calcium  9.2     Chloride  102     CO2  27     Cocaine (Metab.) Negative      Color, UA Yellow      Creatinine  1.10     Creatinine, Random Ur 126.8  Comment:  The random urine reference ranges provided were established   for 24 hour urine collections.  No reference ranges exist for  random urine specimens.  Correlate clinically.        Differential Method  Automated     eGFR if African American  >60.0     eGFR if non   >60.0  Comment:  Calculation used to obtain the estimated glomerular filtration  rate (eGFR) is the CKD-EPI equation.        Eos #  0.1     Eosinophil%  2.7     Glucose  126(H)     Glucose, UA Negative      Gran #  1.2(L)     Gran%  26.7(L)     Hematocrit  34.7(L)     Hemoglobin  11.8(L)     Ketones, UA Negative      Leukocytes, UA Negative      Lymph #  2.8     Lymph%  62.4(H)     MCH  27.1     MCHC   34.0     MCV  80(L)     Mono #  0.3     Mono%  7.7     MPV  8.6(L)     Nitrite, UA Negative      Occult Blood UA Negative      Opiate Scrn, Ur Negative      pH, UA 7.0      Platelets  220     Potassium  3.5     Total Protein  8.2     Protein, UA Negative  Comment:  Recommend a 24 hour urine protein or a urine   protein/creatinine ratio if globulin induced proteinuria is  clinically suspected.        RBC  4.35(L)     RDW  13.6     Sodium  141     Specific Gravity, UA 1.015      Specimen UA Urine, Clean Catch      Marijuana (THC) Metabolite Presumptive Positive      Toxicology Information SEE COMMENT  Comment:  This screen includes the following classes of drugs at the   listed cut-off:  Benzodiazepines                  200 ng/ml  Methadone                        300 ng/ml  Cocaine metabolite               300 ng/ml  Opiates                          300 ng/ml  Barbiturates                     200 ng/ml  Amphetamines                    1000 ng/ml  Marijuana metabs (THC)            50 ng/ml  Phencyclidine (PCP)               25 ng/ml  High concentrations of Diphenhydramine may cross-react with  Phencyclidine PCP screening immunoassay giving a false   positive result.  High concentrations of Methylenedioxymethamphetamine (MDMA aka  Ectasy) and other structurally similar compounds may cross-   react with the Amphetamine/Methamphetamine screening   immunoassay giving a false positive result.  A metabolite of the anti-HIV drug Sustiva () may cause  false positive results in the Marijuana metabolite (THC)   screening assay.  Note: This exception list includes only more common   interferants in toxicology screen testing.  Because of many   cross-reactantspositive results on toxicology drug screens   should be confirmed whenever results do not correlate with   clinical presentation.  This report is intended for use in clinical monitoring and  management of patients. It is not intended for use in   employment related  drug testing.  Because of any cross-reactants, positive results on toxicology  drug screens should be confirmed whenever results do not  correlate with clinical presentation.  Presumptive positive results are unconfirmed and may be used   only for medical purposes.        Urobilinogen, UA 1.0      WBC  4.41           Significant Imaging: None

## 2017-12-16 NOTE — ED PROVIDER NOTES
"Encounter Date: 12/15/2017       History     Chief Complaint   Patient presents with    Psychiatric Evaluation     Pt states was on transport van on way home from [facility] outpatient therapy when he started hearing voices, states the voices told him "to hurt somebody", states "I live with that person and I don't want to go to [facility] so I need to be PEC'd."  Pt denies SI.  States has had inpatient treatment "in and out since 2008"  states last in patient treatment was 4/17.  pt calm and cooperative at this time.      HPI  Review of patient's allergies indicates:   Allergen Reactions    Shellfish containing products Itching and Swelling     Past Medical History:   Diagnosis Date    Anxiety     Asthma     Bipolar 1 disorder     Depression     Hep C w/o coma, chronic     HIV (human immunodeficiency virus infection)     HTN (hypertension)     Schizoaffective disorder, bipolar type      Past Surgical History:   Procedure Laterality Date    APPENDECTOMY      HERNIA REPAIR       Family History   Problem Relation Age of Onset    Hypertension Mother     Stroke Father     Hypertension Father      Social History   Substance Use Topics    Smoking status: Current Every Day Smoker     Packs/day: 1.00     Years: 15.00     Types: Cigarettes    Smokeless tobacco: Never Used    Alcohol use No     Review of Systems    Physical Exam     Initial Vitals [12/15/17 1640]   BP Pulse Resp Temp SpO2   131/71 85 18 98.7 °F (37.1 °C) 98 %      MAP       91         Physical Exam    ED Course   Procedures  Labs Reviewed   CBC W/ AUTO DIFFERENTIAL - Abnormal; Notable for the following:        Result Value    RBC 4.35 (*)     Hemoglobin 11.8 (*)     Hematocrit 34.7 (*)     MCV 80 (*)     MPV 8.6 (*)     Gran # 1.2 (*)     Gran% 26.7 (*)     Lymph% 62.4 (*)     All other components within normal limits   COMPREHENSIVE METABOLIC PANEL - Abnormal; Notable for the following:     Glucose 126 (*)     AST 57 (*)     ALT 76 (*)     All " other components within normal limits   URINALYSIS   DRUG SCREEN PANEL, URINE EMERGENCY   ALCOHOL,MEDICAL (ETHANOL)   ACETAMINOPHEN LEVEL                     6 PM patient medically clear for psychiatric referral           ED Course      Clinical Impression:   The primary encounter diagnosis was Paranoid schizophrenia. Diagnoses of Homicidal ideation and Bipolar 1 disorder, manic, moderate were also pertinent to this visit.                           Pio Grubbs MD  12/15/17 2015       Pio Grubbs MD  12/15/17 2016

## 2017-12-16 NOTE — ASSESSMENT & PLAN NOTE
Hep Panel pending, will require viral load  Patient claims to be sober from alcohol despite previous heavy use  Recommend referral for care upon discharge

## 2017-12-16 NOTE — ASSESSMENT & PLAN NOTE
Patient reports compliance with [facility] Therapy  Genvoya continued   RPR, G/C pending in light of high risk behavior

## 2017-12-16 NOTE — ED NOTES
Mhere- Pt has been Accepted to [facility]. Call report to [phone]. Attending will be Dr. Guevara.

## 2017-12-16 NOTE — ASSESSMENT & PLAN NOTE
Recommend warning appropriate authorities, family should threats persist at time of discharge  Collateral still needs to be obtained
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
## 2017-12-16 NOTE — ED NOTES
zohaib- JAVED Salgado from [facility] called Transfer center to notify them that [facility] has open beds. Will await further instructions.

## 2017-12-16 NOTE — H&P
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 None    Assessment/Plan:     Schizoaffective disorder, bipolar type    Reports Compliance with Invega Sustenna 156 mg IM last received 12/3  Reports AH despite no objective signs of psychosis  R/O Borderline Personality Disorder as clouding diagnosis  Seroquel 100 mg qhs started 12/16 for mood and sleep   Continue Paxil 30 mg daily   Haldol + Ativan PRN non redirectable agitation  Lipid panel + HbA1c pending         Observation of adult antisocial behavior    Recommend warning appropriate authorities, family should threats persist at time of discharge  Collateral still needs to be obtained         Tobacco use    Nicotine Patch ordered        Cocaine use    Recommend counseling on cessation and gauging interest in residential rehab once stabilized         Wheezing    Continued home inhalers   CTAB on exam         Hep C w/o coma, chronic    Hep Panel pending, will require viral load  Patient claims to be sober from alcohol despite previous heavy use  Recommend referral for care upon discharge           HIV (human immunodeficiency virus infection)    Patient reports compliance with HARRT Therapy  Genvoya continued   RPR, G/C pending in light of high risk behavior         HTN (hypertension)    Continued Losartan 50 mg daily            Estimated  Discharge Date:   Initial Discharge Plan: Home    Prognosis: Guarded    Need for Continued Hospitalization:   Psychiatric illness continues to pose a potential threat to life or bodily function, of self or others, thereby requiring the need for continued inpatient psychiatric hospitalization.    Total Time: 50 with greater than 50% of time spent in counseling and/or coordination of care.     Mayelin Marshall MD   Psychiatry  [facility]

## 2017-12-16 NOTE — CONSULTS
"Tele-Consultation to Emergency Department from Psychiatry    Please see previous notes:    Patient agreeable to consultation via telepsychiatry.    Consultation started: 12/15/2017 at 5:45 PM  The chief complaint leading to psychiatric consultation is:" Homicidal Ideations, depression ,and anxiety"  This consultation was requested byDr. Uche Evans, the Emergency Department attending physician.  The location of the consulting psychiatrist is. Home  The patient location is [facility].  The patient arrived at the ED at: 3 PM    Also present with the patient at the time of the consultation: Patient        Patient Identification:  Reji Michaels is a 37 y.o. male.    Patient information was obtained from patient.  Patient presented voluntarily to the Emergency Department by private vehicle.    History of Present Illness:   This is 37 years old AAM with a history of Schizophrenia VS Bipolar d/o who was brought into ER when he reported that he is hearing voices telling him to kill his step-sister and his step- father and he doesn't want to do  that. Patient was on transport Van returning home [facility] .He lives with his step sister and and his step -father.    Upon evaluation:He says he is not doing so "good".He claims he is hearing voices telling him all different kind of bad things that he has HIV and telling him to harm his sisiter who is pregnant and also his step-father. Says he doesn't sleep well due to the voices , sleeps during daytime .    He says he has been getting Invega I/M monthly injection , received it on Dec 4 , says he  started hearing voices all of sudden yesterday , Says he needs higher dose of Invega .  Says I have been tried all different kind of meds.He likes Invega shot . Says he will take anything if it's in addition to Invega shot . He says " I have history of psychosis since 2008". Says " I don't know voices came back from nowhere"    He says he is depressed " , anxious, paranoid , not sleeping well and having all different kind of mixed emotions , says the only medication that works for his sleep is Xanax. Denies to SI .    He says his sister borrowed some money from him and now she doesn't want to give it back to him. His step dad asked him to leave the house when he asked about money .He was living with his sister and step dad, probably homeless now , some secondary gain.    Denies drinking alcohol.  Uses THC occasionally , last used 2 weeks ago.      Psychiatric History:   Hospitalization: Yes, multiple hospitalizations, last one was in April 2017  Medication Trials: Yes  Suicide Attempts: no  Violence: No  Depression: Yes  Vicki: No  AH's: Yes  Delusions: Yes    Review of Systems:  Negative except Depression , anxiety and AH     Past Medical History:   Past Medical History:   Diagnosis Date    Anxiety     Asthma     Bipolar 1 disorder     Depression     Hep C w/o coma, chronic     HIV (human immunodeficiency virus infection)     HTN (hypertension)     Schizoaffective disorder, bipolar type         Seizures: No  Head trauma/l.o.c.: No  Wish to become pregnant[if female of childbearing age]: NA    Allergies: Listed below  Review of patient's allergies indicates:   Allergen Reactions    Shellfish containing products Itching and Swelling       Medications in ER:   Medications   ALPRAZolam tablet 1 mg (1 mg Oral Given 12/15/17 1803)       Medications at home: Paxil , Xanax , and Invega Sustenna    Substance Abuse History:   Alchohol: Denies  Drug: Uses THC , last used 2 weeks ago     Legal History:   Past charges/incarcerations: Denies  Pending charges: Denies    Family Psychiatric History: Unknown    Social History:   History of Physical/Sexual Abuse: NA  Education: HS    Employment/Disability: On disability   Financial: Ok  Relationship Status/Sexual Orientation: Single   Children: One daughter who is 19 years old , no contact with her in 10 years   Housing  "Status: Was living with his step- sister  Mandaen: NA   History: NA   Recreational Activities: Not able to enjoy  Access to Gun: No     Current Evaluation:     Constitutional  Vitals:  Vitals:    12/15/17 1640 12/15/17 1752   BP: 131/71 130/74   Pulse: 85 81   Resp: 18 16   Temp: 98.7 °F (37.1 °C) 98.5 °F (36.9 °C)   TempSrc: Oral Oral   SpO2: 98% 99%   Weight: 83.9 kg (185 lb)    Height: 6' 1" (1.854 m)       General:  unremarkable, age appropriate     Musculoskeletal  Muscle Strength/Tone:   moving arms normally   Gait & Station:   sitting on stretcher     Psychiatric  Level of Consciousness: alert  Orientation: oriented to person, place and time  Grooming: in hospital gown  Psychomotor Behavior: no agitation  Speech: normal in rate, rhythm and volume  Language: uses words appropriately  Mood: Depressed  Affect: Restricted  Thought Process: Circumstantial  Associations: Intact  Thought Content: +AH and Paranoia, No SI  Memory: Intact  Attention: intact to interview  Fund of Knowledge: appears adequate  Insight:Poor  Judgement: Poor    Relevant Elements of Neurological Exam: no abnormality of posture noted    Assessment - Diagnosis - Goals:     Diagnosis/Impression: Schizophrenia Vs Bipolar d/o    Rec: PEC d/t DTO , cont home meds, start him on Risperdal 1 mg po BID for psychosis, needs to be hospitalized in Inpatient Psych unit for stablization     Time with patient: 15 minutes    More than 50% of the time was spent counseling/coordinating care    Laboratory Data:   Labs Reviewed   CBC W/ AUTO DIFFERENTIAL - Abnormal; Notable for the following:        Result Value    RBC 4.35 (*)     Hemoglobin 11.8 (*)     Hematocrit 34.7 (*)     MCV 80 (*)     MPV 8.6 (*)     Gran # 1.2 (*)     Gran% 26.7 (*)     Lymph% 62.4 (*)     All other components within normal limits   COMPREHENSIVE METABOLIC PANEL - Abnormal; Notable for the following:     Glucose 126 (*)     AST 57 (*)     ALT 76 (*)     All other components " within normal limits   ALCOHOL,MEDICAL (ETHANOL)   ACETAMINOPHEN LEVEL   URINALYSIS   DRUG SCREEN PANEL, URINE EMERGENCY     Thanks Dr.Dr. Uche Evans for the consult.    Consulting clinician was informed of the encounter and consult note.    Consultation ended: 12/15/2017 at 6:45 PM

## 2017-12-16 NOTE — ED NOTES
Pt personal belongings include.  1 wallet 93 cent in change, one sport jacket, 5 pair of socks 2 bars of soap, shaving cream hairspray, lotion, 2 pair underware, 2 pair aof jeans, 4 t shirts, 2 shirts. One pair of shoes1 tote bag , one pair lounge pants, drivers license, meds,

## 2017-12-17 LAB
C TRACH DNA SPEC QL NAA+PROBE: NOT DETECTED
N GONORRHOEA DNA SPEC QL NAA+PROBE: NOT DETECTED

## 2017-12-17 PROCEDURE — 12400001 HC PSYCH SEMI-PRIVATE ROOM

## 2017-12-17 PROCEDURE — 25000003 PHARM REV CODE 250: Performed by: PSYCHIATRY & NEUROLOGY

## 2017-12-17 PROCEDURE — 99233 SBSQ HOSP IP/OBS HIGH 50: CPT | Mod: ,,, | Performed by: PSYCHIATRY & NEUROLOGY

## 2017-12-17 RX ORDER — QUETIAPINE FUMARATE 100 MG/1
100 TABLET, FILM COATED ORAL NIGHTLY
Status: DISCONTINUED | OUTPATIENT
Start: 2017-12-17 | End: 2017-12-21 | Stop reason: HOSPADM

## 2017-12-17 RX ADMIN — PALIPERIDONE 6 MG: 6 TABLET, EXTENDED RELEASE ORAL at 08:12

## 2017-12-17 RX ADMIN — PANTOPRAZOLE SODIUM 40 MG: 40 TABLET, DELAYED RELEASE ORAL at 08:12

## 2017-12-17 RX ADMIN — THERA TABS 1 TABLET: TAB at 08:12

## 2017-12-17 RX ADMIN — ALPRAZOLAM 1 MG: 1 TABLET ORAL at 06:12

## 2017-12-17 RX ADMIN — LOSARTAN POTASSIUM 50 MG: 25 TABLET, FILM COATED ORAL at 08:12

## 2017-12-17 RX ADMIN — ALPRAZOLAM 1 MG: 1 TABLET ORAL at 02:12

## 2017-12-17 RX ADMIN — PAROXETINE HYDROCHLORIDE HEMIHYDRATE 30 MG: 30 TABLET, FILM COATED ORAL at 08:12

## 2017-12-17 RX ADMIN — QUETIAPINE FUMARATE 100 MG: 100 TABLET, FILM COATED ORAL at 08:12

## 2017-12-17 RX ADMIN — FOLIC ACID 1 MG: 1 TABLET ORAL at 08:12

## 2017-12-17 RX ADMIN — ALPRAZOLAM 1 MG: 1 TABLET ORAL at 08:12

## 2017-12-17 NOTE — PSYCH
"Patient refused his efavirenz this evening. Patient stated he no longer takes this medication and instead takes a different one. Patient educated on the importance of this medication and informed that the RN will speak with the MD regarding the medication the patient states he takes instead. Patient also complained of poor sleep. Hydroxyzine offered, which patient refused, asking why he was not receiving Seroquel. Patient became agitated when informed that the Seroquel had been discontinued and stated, "Why are yall changing my medications like that?" and walked away. Currently patient is laying in his own bed. Will continue to monitor.  "

## 2017-12-17 NOTE — SUBJECTIVE & OBJECTIVE
Interval History: patient continues to voice SI and HI towards family, though contracts for safety on unit.  He continues to report he is angry and somewhat depressed - but primarily angry.  AH persist.  He reports difficulty with sleep last night - tossing and turning.  He attempted to obtain a second xanax dose this AM by claiming first dose wasn't administered - we will need to monitor for med seeking behavior and exaggeration of symptoms, given his hx of substance abuse.      PMHx  Past Medical History Reviewed    ROS  GI: no N/V  Resp: no SOB  Musculoskeletal: +leg cramps      EXAMINATION    VITALS   Vitals:    12/17/17 0815   BP: 127/73   Pulse: (!) 55   Resp: 18   Temp: 97.6 °F (36.4 °C)       CONSTITUTIONAL  General Appearance: stated age, casually dressed, green dyed hair    MUSCULOSKELETAL  Muscle Strength and Tone: no weakness or spasticity  Abnormal Involuntary Movements: no tremor noted, no akathisia or evidence of tardive dyskinesia  Gait and Station: ambulates without assistance    PSYCHIATRIC   Level of Consciousness: alert  Orientation: to person, place, time  Grooming: intact, neat  Psychomotor Behavior: no retardation or agitation, less pacing today  Speech: conversational, spontaneous, loud at times  Language: fluent english, repeats words/phrases  Mood: angry and depressed  Affect: less irritable today.  Remains intense  Thought Process: linear but perseverative, ruminative  Associations: intact, no loosening of associations  Thought Content: +SI/HI.  Contracts for safety on unit.  +AH/PI  Memory: grossly intact  Attention: mildly distractible  Fund of Knowledge: intact  Insight: impaired  Judgment: impaired      Family History     Problem Relation (Age of Onset)    Hypertension Mother, Father    Stroke Father        Social History Main Topics    Smoking status: Current Every Day Smoker     Packs/day: 1.00     Years: 15.00     Types: Cigarettes    Smokeless tobacco: Never Used    Alcohol use  No    Drug use: Yes     Types: Marijuana    Sexual activity: Not on file     Psychotherapeutics     Start     Stop Route Frequency Ordered    12/17/17 2100  QUEtiapine tablet 100 mg      -- Oral Nightly 12/17/17 0851    12/16/17 1215  paliperidone 24 hr tablet 6 mg      -- Oral Daily 12/16/17 1112    12/16/17 1115  ALPRAZolam tablet 1 mg      -- Oral 3 times daily 12/16/17 1111    12/16/17 0900  paroxetine tablet 30 mg      -- Oral Daily 12/16/17 0415    12/15/17 2313  haloperidol tablet 5 mg  (Med - Acute  Behavioral Management)      -- Oral Every 4 hours PRN 12/15/17 2225    12/15/17 2313  LORazepam tablet 2 mg  (Med - Acute  Behavioral Management)      -- Oral Every 4 hours PRN 12/15/17 2225    12/15/17 2313  haloperidol lactate injection 5 mg  (Med - Acute  Behavioral Management)      -- IM Every 4 hours PRN 12/15/17 2225    12/15/17 2313  lorazepam (ATIVAN) injection 2 mg  (Med - Acute  Behavioral Management)      -- IM Every 4 hours PRN 12/15/17 2225           Review of Systems  Objective:     Vital Signs (Most Recent):  Temp: 97.6 °F (36.4 °C) (12/17/17 0815)  Pulse: (!) 55 (12/17/17 0815)  Resp: 18 (12/17/17 0815)  BP: 127/73 (12/17/17 0815) Vital Signs (24h Range):  Temp:  [97.6 °F (36.4 °C)-97.7 °F (36.5 °C)] 97.6 °F (36.4 °C)  Pulse:  [55-61] 55  Resp:  [18] 18  BP: (127-133)/(70-73) 127/73     Height: 6' (182.9 cm)  Weight: 81.6 kg (179 lb 14.3 oz)  Body mass index is 24.4 kg/m².    No intake or output data in the 24 hours ending 12/17/17 1421    Physical Exam     Significant Labs:   Last 24 Hours:   Recent Lab Results     None          Significant Imaging: None

## 2017-12-17 NOTE — ASSESSMENT & PLAN NOTE
+utox on admit.  Also hx of cocaine abuse in past  Will need to monitor closely given he is on xanax 1mg tid - this was confirmed by LA   Ultimately, xanax may not be best medication for patient, but will cautiously continue for now given he receives this monthly from current outpt provider - will attempt further collateral from outpt treatment team  Of note, utox negative for benzos on admit.

## 2017-12-17 NOTE — PLAN OF CARE
Problem: Patient Care Overview (Adult)  Goal: Plan of Care Review  Outcome: Ongoing (interventions implemented as appropriate)  Patient irritable and withdrawn. No interactions with peers and staff.  Denying he did not receive AM dose xanax and not remember talking to MD today. Demanding to talk to MD.Continue to endorse hearing voices not able to elaborate. Will continue to monitor patient.

## 2017-12-17 NOTE — PLAN OF CARE
Problem: Patient Care Overview (Adult)  Goal: Plan of Care Review  Outcome: Ongoing (interventions implemented as appropriate)  No management issues overnight. Withdrawn to room. Did not interact with peers or staff. No indications noted that patient is responding to internal stimuli though he continues to endorse hearing voices. Robinson SI and HI but endorses depressed mood and anger towards family. Demanding and irritable with staff when not given his way. Body odor noted. Slept throughout the night without any disturbances noted. Environmental and safety rounds performed. Will continue to monitor.     Problem: Overarching Goals (Adult)  Goal: Develops/Participates in Therapeutic [place] to Support Successful Transition  Outcome: Ongoing (interventions implemented as appropriate)  Medication compliant. Did not participate or engage with staff and peers during evening group. Spent the majority of the night in his room.    Problem: Homicidal Behavior (Adult,Pediatric)  Goal: Resolved Homicidal Intention  Outcome: Ongoing (interventions implemented as appropriate)  Currently denies HI towards family. Still endorses anger however.    Problem: Fall Risk (Adult)  Goal: Absence of Falls  Patient will demonstrate the desired outcomes by discharge/transition of care.   Outcome: Ongoing (interventions implemented as appropriate)  Remained free of falls. Nonskid socks worn at all times on the unit. Q15 minute rounds performed.
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
## 2017-12-17 NOTE — PSYCH
Patient again requesting xanax and denying receiving it. Patient med seeking. Continues to deny meeting with MD today.

## 2017-12-17 NOTE — ASSESSMENT & PLAN NOTE
Reports Compliance with Invega Sustenna 156 mg IM last received 12/3  Reports AH - appears paranoid on unit  R/o exaggeration of symptoms for secondary gain though does appear decompensated - further collateral needed from family and outpt treatment team.    Augment invega sustenna with invega PO 6mg daily + Seroquel 100mg bedtime.  Ultimately he may need higher dose of invega sustenna.     Continue Paxil 30 mg daily   Continue Xanax 1mg tid    Haldol + Ativan PRN non redirectable agitation

## 2017-12-18 LAB
ALBUMIN SERPL BCP-MCNC: 3.5 G/DL
ALP SERPL-CCNC: 179 U/L
ALT SERPL W/O P-5'-P-CCNC: 86 U/L
ANION GAP SERPL CALC-SCNC: 7 MMOL/L
AST SERPL-CCNC: 59 U/L
BILIRUB SERPL-MCNC: 0.2 MG/DL
BUN SERPL-MCNC: 16 MG/DL
CALCIUM SERPL-MCNC: 9.2 MG/DL
CD3+CD4+ CELLS # BLD: 1328 CELLS/UL (ref 300–1400)
CD3+CD4+ CELLS NFR BLD: 38.7 % (ref 28–57)
CHLORIDE SERPL-SCNC: 104 MMOL/L
CO2 SERPL-SCNC: 27 MMOL/L
CREAT SERPL-MCNC: 0.9 MG/DL
EST. GFR  (AFRICAN AMERICAN): >60 ML/MIN/1.73 M^2
EST. GFR  (NON AFRICAN AMERICAN): >60 ML/MIN/1.73 M^2
GLUCOSE SERPL-MCNC: 85 MG/DL
HAV IGM SERPL QL IA: NEGATIVE
HBV CORE IGM SERPL QL IA: NEGATIVE
HBV SURFACE AG SERPL QL IA: NEGATIVE
HCV AB SERPL QL IA: POSITIVE
POTASSIUM SERPL-SCNC: 4 MMOL/L
PROT SERPL-MCNC: 7.6 G/DL
SODIUM SERPL-SCNC: 138 MMOL/L

## 2017-12-18 PROCEDURE — 80053 COMPREHEN METABOLIC PANEL: CPT

## 2017-12-18 PROCEDURE — 99232 SBSQ HOSP IP/OBS MODERATE 35: CPT | Mod: ,,, | Performed by: PSYCHIATRY & NEUROLOGY

## 2017-12-18 PROCEDURE — 25000003 PHARM REV CODE 250: Performed by: PSYCHIATRY & NEUROLOGY

## 2017-12-18 PROCEDURE — 86361 T CELL ABSOLUTE COUNT: CPT

## 2017-12-18 PROCEDURE — 12400001 HC PSYCH SEMI-PRIVATE ROOM

## 2017-12-18 PROCEDURE — 36415 COLL VENOUS BLD VENIPUNCTURE: CPT

## 2017-12-18 PROCEDURE — 90853 GROUP PSYCHOTHERAPY: CPT | Mod: ,,, | Performed by: PSYCHOLOGIST

## 2017-12-18 RX ORDER — LORAZEPAM 1 MG/1
2 TABLET ORAL EVERY 6 HOURS PRN
Status: DISCONTINUED | OUTPATIENT
Start: 2017-12-18 | End: 2017-12-21 | Stop reason: HOSPADM

## 2017-12-18 RX ADMIN — FLUTICASONE FUROATE AND VILANTEROL TRIFENATATE 1 PUFF: 100; 25 POWDER RESPIRATORY (INHALATION) at 09:12

## 2017-12-18 RX ADMIN — QUETIAPINE FUMARATE 100 MG: 100 TABLET, FILM COATED ORAL at 09:12

## 2017-12-18 RX ADMIN — ALPRAZOLAM 1 MG: 1 TABLET ORAL at 06:12

## 2017-12-18 RX ADMIN — PAROXETINE HYDROCHLORIDE HEMIHYDRATE 30 MG: 30 TABLET, FILM COATED ORAL at 09:12

## 2017-12-18 RX ADMIN — FOLIC ACID 1 MG: 1 TABLET ORAL at 09:12

## 2017-12-18 RX ADMIN — PANTOPRAZOLE SODIUM 40 MG: 40 TABLET, DELAYED RELEASE ORAL at 09:12

## 2017-12-18 RX ADMIN — PALIPERIDONE 6 MG: 6 TABLET, EXTENDED RELEASE ORAL at 09:12

## 2017-12-18 RX ADMIN — LOSARTAN POTASSIUM 50 MG: 25 TABLET, FILM COATED ORAL at 09:12

## 2017-12-18 RX ADMIN — FLUTICASONE PROPIONATE 1 SPRAY: 50 SPRAY, METERED NASAL at 09:12

## 2017-12-18 NOTE — PROGRESS NOTES
LCSW attempted to reach pt's mother Rossy. The number listed has been disconnected or no longer in service. Will approach pt and ask for a number if available
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
## 2017-12-18 NOTE — ASSESSMENT & PLAN NOTE
Reports Compliance with Invega Sustenna 156 mg IM last received 12/3 (obtained at [facility])  Reports AH and paranoia  R/o exaggeration of symptoms for secondary gain though does appear decompensated - further collateral needed from family and outpt treatment team.    Augment invega sustenna with invega PO 6mg daily + Seroquel 100mg bedtime.  Ultimately he may need higher dose of invega sustenna.     Continue Paxil 30 mg daily   Stop Xanax, start withdrawal protocol    Haldol + Ativan PRN non redirectable agitation

## 2017-12-18 NOTE — SUBJECTIVE & OBJECTIVE
Interval History:  Pt continues to endorse active SI/HI. Also notes active paranoia that people are out to get him.     Family History     Problem Relation (Age of Onset)    Hypertension Mother, Father    Stroke Father        Social History Main Topics    Smoking status: Current Every Day Smoker     Packs/day: 1.00     Years: 15.00     Types: Cigarettes    Smokeless tobacco: Never Used    Alcohol use No    Drug use: Yes     Types: Marijuana    Sexual activity: Not on file     Psychotherapeutics     Start     Stop Route Frequency Ordered    12/17/17 2100  QUEtiapine tablet 100 mg      -- Oral Nightly 12/17/17 0851    12/16/17 1215  paliperidone 24 hr tablet 6 mg      -- Oral Daily 12/16/17 1112    12/16/17 1115  ALPRAZolam tablet 1 mg      -- Oral 3 times daily 12/16/17 1111    12/16/17 0900  paroxetine tablet 30 mg      -- Oral Daily 12/16/17 0415    12/15/17 2313  haloperidol tablet 5 mg  (Med - Acute  Behavioral Management)      -- Oral Every 4 hours PRN 12/15/17 2225    12/15/17 2313  LORazepam tablet 2 mg  (Med - Acute  Behavioral Management)      -- Oral Every 4 hours PRN 12/15/17 2225    12/15/17 2313  haloperidol lactate injection 5 mg  (Med - Acute  Behavioral Management)      -- IM Every 4 hours PRN 12/15/17 2225    12/15/17 2313  lorazepam (ATIVAN) injection 2 mg  (Med - Acute  Behavioral Management)      -- IM Every 4 hours PRN 12/15/17 2225           Review of Systems  Objective:     Vital Signs (Most Recent):  Temp: 98.7 °F (37.1 °C) (12/17/17 1915)  Pulse: (!) 59 (12/17/17 1915)  Resp: 16 (12/17/17 1915)  BP: 130/69 (12/17/17 1915) Vital Signs (24h Range):  Temp:  [98.7 °F (37.1 °C)] 98.7 °F (37.1 °C)  Pulse:  [59] 59  Resp:  [16] 16  BP: (130)/(69) 130/69     Height: 6' (182.9 cm)  Weight: 81.6 kg (179 lb 14.3 oz)  Body mass index is 24.4 kg/m².    No intake or output data in the 24 hours ending 12/18/17 0921    Physical Exam      Mental Status Exam:  Appearance: unremarkable, age  appropriate  Behavior/Cooperation:  Appropriate, cooperative  Speech:  normal volume, slowed  Language: uses words appropriately; NO aphasia or dysarthria  Mood: steady  Affect:  congruent with mood and appropriate to situation/content   Thought Process: goal-directed  Thought Content: paranoid, ruminations  Level of Consciousness: Alert and Oriented x3  Memory:  Intact  Attention/concentration: appropriate for age/education.   Fund of Knowledge: appears adequate  Insight: Limited  Judgment: Limited    Significant Labs:   Last 24 Hours:   Recent Lab Results       12/18/17  0530      Albumin 3.5     Alkaline Phosphatase 179(H)     ALT 86(H)     Anion Gap 7(L)     AST 59(H)     Total Bilirubin 0.2  Comment:  For infants and newborns, interpretation of results should be based  on gestational age, weight and in agreement with clinical  observations.  Premature Infant recommended reference ranges:  Up to 24 hours.............<8.0 mg/dL  Up to 48 hours............<12.0 mg/dL  3-5 days..................<15.0 mg/dL  6-29 days.................<15.0 mg/dL       BUN, Bld 16     Calcium 9.2     Chloride 104     CO2 27     Creatinine 0.9     eGFR if African American >60.0     eGFR if non  >60.0  Comment:  Calculation used to obtain the estimated glomerular filtration  rate (eGFR) is the CKD-EPI equation.        Glucose 85     Potassium 4.0     Total Protein 7.6     Sodium 138           Significant Imaging: I have reviewed all pertinent imaging results/findings within the past 24 hours.

## 2017-12-18 NOTE — PLAN OF CARE
12/18/17 1500   [facility] Group Therapy   Group Name Medication   Participation Level Attentive;Sharing   Participation Quality Cooperative   Insight/Motivation Limited   Affect/Mood Display Elevated   Cognition Alert

## 2017-12-18 NOTE — NURSING
"Patient came to the nurses station asking for something for anxiety.  When told he did not have anything he came back to the nurses station asking for something for anger.   Saying "I am pissed off about a lot of things."  Patient then went into the activity room and started coloring.    "

## 2017-12-18 NOTE — PROGRESS NOTES
12/17/17 2000   [facility] Group Therapy   Group Name Community Reintegration   Specific Interventions Other (see comments)  (wrap up)   Participation Level Appropriate   Participation Quality Cooperative   Insight/Motivation Limited   Affect/Mood Display Appropriate   Cognition Alert;Oriented

## 2017-12-18 NOTE — NURSING
Patient has been medication seeking reporting high anxiety levels  Cont to report being angry that his xanax was discontinued.  No complains of pain.  Patient is visible on the unit.  Currently in activities room.  Medication compliant.  Denies any suicidal or homicidal ideations at this time.  No acting out behavior.  Vital signs stable.

## 2017-12-18 NOTE — PHYSICIAN QUERY
PT Name: Reji Michaels  MR #: [id number]    Physician Query Form - HIV Clarification       CDS: Olivia Guido RN CCDS                  Contact Information: [email or web address]    This form is a permanent document in the medical record.     Query Date: December 18, 2017      By submitting this query, we are merely seeking further clarification of documentation. Please utilize your independent clinical judgment when addressing the question(s) below.    The Medical record contains the following:   Indicators   Supporting Clinical Findings Location in Medical Record   X HIV or AIDS HIV (human immunodeficiency virus infection) H&P    CD4 Count          CD4%        Viral Load      History of Opportunistic Infection      Cancer  Pneumocystis Pneumonia (PCP)  Cytomegalovirus (CMV)  Kaposi Sarcoma      HIV-Related Conditions (e.g. Dementia, Encephalopathy) documented     X Medications Genvoya Po  Atripla Po  Home medication  Home medicaitons   X Other Patient reports compliance with HARRT Therapy  Genvoya continued   RPR, G/C pending in light of high risk behavior          H&P     Please clarify the patients HIV status  Per CDC publication Vol 60 RR-17 https://www.cdc.gov/mmwr/preview/mmwrhtml/kl5640w5.htmRelating to the classification HIV Infection, once a patient is diagnosed with AIDS the diagnosis still stands even if, after treatment, the CD4+ T cell count rises to above 200 per ìL of blood or other AIDS-defining illnesses are cured.       The following are AIDS-Defining Illnesses or HIV-Related Diseases.  Bacterial infections, multiple or recurrent only in children aged <6 years Kaposi sarcoma   Candidiasis of bronchi, trachea, or lungs Lymphoma, Burkitt (or equivalent term)   Candidiasis of esophagus Lymphoma, immunoblastic (or equivalent term)   Cervical cancer, invasive Only among adults, adolescents, and children aged > 6 years. Lymphoma, primary, of brain   Coccidioidomycosis, disseminated or extrapulmonary  Mycobacterium avium complex or Mycobacterium kansasii, disseminated or extrapulmonary   Cryptococcosis, extrapulmonary Mycobacterium tuberculosis of any site, pulmonary, disseminated, or extrapulmonary   Cryptosporidiosis, chronic intestinal (>1 months duration) Mycobacterium, other species or unidentified species, disseminated or extrapulmonary   Cytomegalovirus disease (other than liver, spleen, or nodes), onset at age >1 month Pneumocystis jirovecii (previously known as Pneumocystis carinii) pneumonia   Cytomegalovirus retinitis (with loss of vision) Pneumonia, recurrent Only among adults, adolescents, and children aged .6 years.   Encephalopathy attributed to HIV Progressive multifocal leukoencephalopathy   Herpes simplex: chronic ulcers (>1 months duration) or bronchitis, pneumonitis, or esophagitis (onset at age >1 month) Salmonella septicemia, recurrent   Histoplasmosis, disseminated or extrapulmonary Toxoplasmosis of brain, onset at age >1 month   Isosporiasis, chronic intestinal (>1 months duration) Wasting syndrome attributed to HIV     [ ] Asymptomatic HIV Infection - Positive Status Only - without any history of (or current) AIDS-Defining Illness or HIV-Related Illness  [ ] Other (please specify): ____________________________________  [ ] Clinically Undetermined    Please document in your progress notes daily for the duration of treatment until resolved and include in your discharge summary.

## 2017-12-18 NOTE — PLAN OF CARE
"Problem: Patient Care Overview (Adult)  Goal: Plan of Care Review  Outcome: Ongoing (interventions implemented as appropriate)  Remains irritable, anxious. Minimal interaction with peers and staff. When asked about current mood, verbalized "not good." Often observed pacing hallways. Frequently requests Xanax. Currently does not endorsed homicidal ideations. No behavioral disturbances during the evening. Medication compliant. Modified visual contact maintained per MD orders.       "

## 2017-12-18 NOTE — PROGRESS NOTES
12/18/17 0900 12/18/17 1000 12/18/17 1100   [facility] Therapy   Group Name Community Reintegration Mental Awareness Education   Specific Interventions Current Events Cognitive Stimulation Training Guided Imagery/Relaxation   Participation Level Minimal --  --    Participation Quality Cooperative Refused Refused   Insight/Motivation Limited --  --    Affect/Mood Display Appropriate --  --    Cognition Alert;Oriented --  --        12/18/17 1300   [facility] Therapy   Group Name Therapeutic Recreation   Specific Interventions Skilled Activity Crafts   Participation Level Minimal   Participation Quality Cooperative   Insight/Motivation Limited   Affect/Mood Display Appropriate   Cognition Alert;Oriented

## 2017-12-18 NOTE — PROGRESS NOTES
RODOLFOW spoke with pt and asked for a phone number and he said he does not want us to contact his mother at this time.

## 2017-12-18 NOTE — PLAN OF CARE
Problem: Patient Care Overview (Adult)  Goal: Plan of Care Review  Outcome: Ongoing (interventions implemented as appropriate)  Patient is dressed and visible on the unit.   He denies any homicidal ideations when asked.  He has an irritable mood.   Affect is irritable.  When asked what was wrong he stated.  I didn't like what that doctor said.  He is guarded when interacting with staff.     Problem: Overarching Goals (Adult)  Goal: Adheres to Safety Considerations for Self and Others  Outcome: Ongoing (interventions implemented as appropriate)  Behavior appropriate no safety  Issues .  Goal: Optimized Coping Skills in Response to Life Stressors  Outcome: Ongoing (interventions implemented as appropriate)  Patient unable to identify coping skills at this time.   Goal: Develops/Participates in Therapeutic [place] to Support Successful Transition  Outcome: Ongoing (interventions implemented as appropriate)  Participating in plan of care but insight is poor.      Problem: Homicidal Behavior (Adult,Pediatric)  Goal: Resolved Homicidal Intention  Outcome: Ongoing (interventions implemented as appropriate)  Denies any homicidal ideations at this time.     Problem: Fall Risk (Adult)  Goal: Absence of Falls  Patient will demonstrate the desired outcomes by discharge/transition of care.   Outcome: Ongoing (interventions implemented as appropriate)  No falls or injuries noted.

## 2017-12-19 PROCEDURE — 90853 GROUP PSYCHOTHERAPY: CPT | Mod: ,,, | Performed by: PSYCHOLOGIST

## 2017-12-19 PROCEDURE — 25000003 PHARM REV CODE 250: Performed by: PSYCHIATRY & NEUROLOGY

## 2017-12-19 PROCEDURE — 12400001 HC PSYCH SEMI-PRIVATE ROOM

## 2017-12-19 PROCEDURE — 99232 SBSQ HOSP IP/OBS MODERATE 35: CPT | Mod: ,,, | Performed by: PSYCHIATRY & NEUROLOGY

## 2017-12-19 RX ADMIN — LOSARTAN POTASSIUM 50 MG: 25 TABLET, FILM COATED ORAL at 08:12

## 2017-12-19 RX ADMIN — QUETIAPINE FUMARATE 100 MG: 100 TABLET, FILM COATED ORAL at 08:12

## 2017-12-19 RX ADMIN — FOLIC ACID 1 MG: 1 TABLET ORAL at 08:12

## 2017-12-19 RX ADMIN — THERA TABS 1 TABLET: TAB at 08:12

## 2017-12-19 RX ADMIN — PALIPERIDONE 6 MG: 6 TABLET, EXTENDED RELEASE ORAL at 08:12

## 2017-12-19 RX ADMIN — PAROXETINE HYDROCHLORIDE HEMIHYDRATE 30 MG: 30 TABLET, FILM COATED ORAL at 08:12

## 2017-12-19 RX ADMIN — PANTOPRAZOLE SODIUM 40 MG: 40 TABLET, DELAYED RELEASE ORAL at 08:12
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
## 2017-12-19 NOTE — PROGRESS NOTES
12/19/17 [address] [place] [address] Group Therapy   Group Name Therapeutic Recreation   Participation Level Minimal;None   Participation Quality Withdrawn   Affect/Mood Display Blunted;Flat;Irritable   Patient refused activities after maximum encouragement
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
## 2017-12-19 NOTE — PROGRESS NOTES
Group Psychotherapy (PhD/LCSW)    [facility]    Clinical status of patient: Inpatient    Date: 12/19/2017    Group Focus: Life Skills    Length of service: 22770 - 35-40 minutes    Number of patients in attendance: 6    Referred by: Acute Psychiatry Unit Treatment Team    Target symptoms: Substance Abuse; Mood d/o; Psychosis    Patient's response to treatment: Pt did not actively participate in group.     Progress toward goals: Minimal progress     Interval History: Pt presented as restless in group. He came in and out several times. He listened to the group discussion in the doorway for a while and then finally came in and settled down but did not actively participate       Diagnosis: Schizoaffective d/o; Cannabis abuse; Cocaine abuse    Plan: Continue treatment on [facility]

## 2017-12-19 NOTE — PROGRESS NOTES
12/18/17 2000   [facility] Group Therapy   Group Name Community Reintegration   Specific Interventions Other (see comments)  (wrap-up)   Participation Level Active   Participation Quality Cooperative   Insight/Motivation Limited   Affect/Mood Display Appropriate   Cognition Alert

## 2017-12-19 NOTE — ASSESSMENT & PLAN NOTE
+utox on admit.  Also hx of cocaine abuse in past  - counseled on cessation, motivational interviewing applied - does not appear motivated for abstinence at this time

## 2017-12-19 NOTE — PLAN OF CARE
Problem: Patient Care Overview (Adult)  Goal: Plan of Care Review  Outcome: Ongoing (interventions implemented as appropriate)  Remains irritable, withdrawn. Minimal interaction with peers and staff. Did not participate in evening group. Continues to request Xanax though educated medication has been discontinued. Currently does not endorse homicidal ideations. No behavioral disturbances during the evening. Medication compliant. Modified visual contact maintained per MD orders.

## 2017-12-19 NOTE — PROGRESS NOTES
Group Psychotherapy (PhD/LCSW)    Site: [facility]    Clinical status of patient: Inpatient    Date: 12/18/2017    Group Focus: Life Skills    Length of service: 62307 - 35-40 minutes    Number of patients in attendance: 7    Referred by: Acute Psychiatry Unit Treatment Team    Target symptoms: Substance Abuse; Mood d/o; Psychosis    Patient's response to treatment: Active Listening and Self-disclosure    Progress toward goals: Progressing slowly    Interval History: Pt presented as alert and attentive in group. He participated actively in a discussion of coping with anxiety. He acknowledged that sometimes when he stressed and anxious he feels like hurting someone and then he may later feel guilty these feelings.     Diagnosis: Schizoaffective d/o; Cannabis abuse; Cocaine abuse    Plan: Continue treatment on [facility]

## 2017-12-19 NOTE — SUBJECTIVE & OBJECTIVE
"Interval History:   Pt notes that he is currently struggling with a depressed mood. He states he is angry.  He continues to perseverate about harming himself and his family - no HI but states he would fight them.  He has insight into fact that this would likely lead to repercussions with the law.  He at times expresses hope that these thoughts would subside, and he was reminded that we are working on this goal together.  He remains fixated on xanax.      PMHx  Past Medical History Reviewed    ROS  Musculoskeletal: no pain  GI: appetite intact      EXAMINATION    VITALS   Vitals:    12/19/17 0750   BP: 119/79   Pulse: 72   Resp: 16   Temp: 97.7 °F (36.5 °C)       CONSTITUTIONAL  General Appearance: stated age, casually dressed, green dyed hair    MUSCULOSKELETAL  Muscle Strength and Tone: no weakness or spasticity  Abnormal Involuntary Movements: no tremor noted  Gait and Station: ambulates without assistance    PSYCHIATRIC   Level of Consciousness: alert  Orientation: to person, place, time  Grooming: minimal  Psychomotor Behavior: no retardation or agitation  Speech: spontaneous, conversational, loud and argumentative at times  Language: fluent english, repeats words/phrases  Mood: depressed and angry - "pissed"  Affect: irritable   Thought Process: linear, ruminative, perseverative, concrete  Associations: intact, no loosening of associations  Thought Content: +SI/HI.  +paranoia  Memory: intact  Attention: not distractible  Fund of Knowledge: limited  Insight: impaired  Judgment: impaired      Family History     Problem Relation (Age of Onset)    Hypertension Mother, Father    Stroke Father        Social History Main Topics    Smoking status: Current Every Day Smoker     Packs/day: 1.00     Years: 15.00     Types: Cigarettes    Smokeless tobacco: Never Used    Alcohol use No    Drug use: Yes     Types: Marijuana    Sexual activity: Not on file     Psychotherapeutics     Start     Stop Route Frequency Ordered "    12/18/17 1115  LORazepam tablet 2 mg      -- Oral Every 6 hours PRN 12/18/17 1017    12/17/17 2100  QUEtiapine tablet 100 mg      -- Oral Nightly 12/17/17 0851    12/16/17 1215  paliperidone 24 hr tablet 6 mg      -- Oral Daily 12/16/17 1112    12/16/17 0900  paroxetine tablet 30 mg      -- Oral Daily 12/16/17 0415    12/15/17 2313  haloperidol tablet 5 mg  (Med - Acute  Behavioral Management)      -- Oral Every 4 hours PRN 12/15/17 2225    12/15/17 2313  LORazepam tablet 2 mg  (Med - Acute  Behavioral Management)      -- Oral Every 4 hours PRN 12/15/17 2225    12/15/17 2313  haloperidol lactate injection 5 mg  (Med - Acute  Behavioral Management)      -- IM Every 4 hours PRN 12/15/17 2225    12/15/17 2313  lorazepam (ATIVAN) injection 2 mg  (Med - Acute  Behavioral Management)      -- IM Every 4 hours PRN 12/15/17 2225           Review of Systems  Objective:     Vital Signs (Most Recent):  Temp: 97.7 °F (36.5 °C) (12/19/17 0750)  Pulse: 72 (12/19/17 0750)  Resp: 16 (12/19/17 0750)  BP: 119/79 (12/19/17 0750) Vital Signs (24h Range):  Temp:  [97.7 °F (36.5 °C)-98.4 °F (36.9 °C)] 97.7 °F (36.5 °C)  Pulse:  [65-78] 72  Resp:  [16-20] 16  BP: (110-138)/(64-79) 119/79     Height: 6' (182.9 cm)  Weight: 81.6 kg (179 lb 14.3 oz)  Body mass index is 24.4 kg/m².    No intake or output data in the 24 hours ending 12/19/17 0959    Physical Exam      Significant Labs:   Last 24 Hours:   Recent Lab Results     None          Significant Imaging: None

## 2017-12-19 NOTE — ASSESSMENT & PLAN NOTE
Reports Compliance with Invega Sustenna 156 mg IM last received 12/3 (obtained at [facility])  Reports AH and paranoia  R/o exaggeration of symptoms for secondary gain though does appear decompensated - further collateral needed from family and outpt treatment team.    Currently we have been augmenting invega sustenna with invega PO 6mg daily + Seroquel 100mg bedtime.  Ultimately he may need higher dose of invega sustenna.  Will re-examine oral neuroleptics with eye to consolidating     Continue Paxil 30 mg daily     Xanax discontinued - maintain withdrawal precautions    Haldol + Ativan PRN non redirectable agitation

## 2017-12-19 NOTE — ASSESSMENT & PLAN NOTE
Hep C+  Patient claims to be sober from alcohol despite previous heavy use  Recommend referral for care upon discharge

## 2017-12-20 PROCEDURE — 90853 GROUP PSYCHOTHERAPY: CPT | Mod: ,,, | Performed by: PSYCHOLOGIST

## 2017-12-20 PROCEDURE — 99232 SBSQ HOSP IP/OBS MODERATE 35: CPT | Mod: GC,,, | Performed by: PSYCHIATRY & NEUROLOGY

## 2017-12-20 PROCEDURE — 25000003 PHARM REV CODE 250: Performed by: PSYCHIATRY & NEUROLOGY

## 2017-12-20 PROCEDURE — 12400001 HC PSYCH SEMI-PRIVATE ROOM

## 2017-12-20 RX ADMIN — QUETIAPINE FUMARATE 100 MG: 100 TABLET, FILM COATED ORAL at 08:12

## 2017-12-20 RX ADMIN — PAROXETINE HYDROCHLORIDE HEMIHYDRATE 30 MG: 30 TABLET, FILM COATED ORAL at 09:12

## 2017-12-20 RX ADMIN — THERA TABS 1 TABLET: TAB at 09:12

## 2017-12-20 RX ADMIN — FOLIC ACID 1 MG: 1 TABLET ORAL at 09:12

## 2017-12-20 RX ADMIN — PALIPERIDONE 6 MG: 6 TABLET, EXTENDED RELEASE ORAL at 09:12

## 2017-12-20 RX ADMIN — PANTOPRAZOLE SODIUM 40 MG: 40 TABLET, DELAYED RELEASE ORAL at 09:12

## 2017-12-20 RX ADMIN — LOSARTAN POTASSIUM 50 MG: 25 TABLET, FILM COATED ORAL at 09:12
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
## 2017-12-20 NOTE — PROGRESS NOTES
12/20/17 0900   [facility] Group Therapy   Group Name Community [facility]integration   Specific Interventions Current Events   Participation Level Minimal;Appropriate   Participation Quality Cooperative   Insight/Motivation Good   Affect/Mood Display Appropriate   Cognition Alert;Oriented

## 2017-12-20 NOTE — PROGRESS NOTES
12/20/17 0900 12/20/17 1000 12/20/17 1100   [facility] Group Therapy   Group Name Community Reintegration Mental Awareness Education   Specific Interventions Current Events Resocialization Guided Imagery/Relaxation   Participation Level Minimal;Appropriate Active;Appropriate --    Participation Quality Cooperative Cooperative;Social Refused   Insight/Motivation Good Good --    Affect/Mood Display Appropriate Appropriate --    Cognition Alert;Oriented Alert;Oriented --        12/20/17 1300   [facility] Group Therapy   Group Name Therapeutic Recreation   Specific Interventions --    Participation Level --    Participation Quality Withdrawn;Refused;Sleeping   Insight/Motivation --    Affect/Mood Display --    Cognition --

## 2017-12-20 NOTE — SUBJECTIVE & OBJECTIVE
Interval History: Denies SI and HI. Also denies AVH and paranoia.     Family History     Problem Relation (Age of Onset)    Hypertension Mother, Father    Stroke Father        Social History Main Topics    Smoking status: Current Every Day Smoker     Packs/day: 1.00     Years: 15.00     Types: Cigarettes    Smokeless tobacco: Never Used    Alcohol use No    Drug use: Yes     Types: Marijuana    Sexual activity: Not on file     Psychotherapeutics     Start     Stop Route Frequency Ordered    12/18/17 1115  LORazepam tablet 2 mg      -- Oral Every 6 hours PRN 12/18/17 1017    12/17/17 2100  QUEtiapine tablet 100 mg      -- Oral Nightly 12/17/17 0851    12/16/17 1215  paliperidone 24 hr tablet 6 mg      -- Oral Daily 12/16/17 1112    12/16/17 0900  paroxetine tablet 30 mg      -- Oral Daily 12/16/17 0415    12/15/17 2313  haloperidol tablet 5 mg  (Med - Acute  Behavioral Management)      -- Oral Every 4 hours PRN 12/15/17 2225    12/15/17 2313  LORazepam tablet 2 mg  (Med - Acute  Behavioral Management)      -- Oral Every 4 hours PRN 12/15/17 2225    12/15/17 2313  haloperidol lactate injection 5 mg  (Med - Acute  Behavioral Management)      -- IM Every 4 hours PRN 12/15/17 2225    12/15/17 2313  lorazepam (ATIVAN) injection 2 mg  (Med - Acute  Behavioral Management)      -- IM Every 4 hours PRN 12/15/17 2225           Review of Systems  Objective:     Vital Signs (Most Recent):  Temp: 98.1 °F (36.7 °C) (12/19/17 1915)  Pulse: 72 (12/19/17 1915)  Resp: 16 (12/19/17 1915)  BP: 127/72 (12/19/17 1915) Vital Signs (24h Range):  Temp:  [98.1 °F (36.7 °C)] 98.1 °F (36.7 °C)  Pulse:  [72] 72  Resp:  [16] 16  BP: (127)/(72) 127/72     Height: 6' (182.9 cm)  Weight: 81.6 kg (179 lb 14.3 oz)  Body mass index is 24.4 kg/m².    No intake or output data in the 24 hours ending 12/20/17 0824    Physical Exam      Mental Status Exam:  Appearance: unremarkable, age appropriate  Behavior/Cooperation:  normal, cooperative  Speech:  appropriate rate, volume and tone normal tone, normal rate, normal pitch, normal volume  Language: uses words appropriately; NO aphasia or dysarthria  Mood: euthymic  Affect:  congruent with mood and appropriate to situation/content   Thought Process: normal and logical  Thought Content: normal, no suicidality, no homicidality, delusions, or paranoia  Level of Consciousness: Alert and Oriented x3  Memory:  Intact   3/3 immediate, 3/3 at 5 minutes    Recent:   Intact; able to report recent events   Remote:  Intact; Named 4/4 past presidents   Attention/concentration: appropriate for age/education.   Fund of Knowledge: appears adequate  Insight:  Fair  Judgment:  Intact    Significant Labs:   Last 24 Hours:   Recent Lab Results     None          Significant Imaging: I have reviewed all pertinent imaging results/findings within the past 24 hours.

## 2017-12-20 NOTE — PLAN OF CARE
Collateral: Sister Andreas [phone]  Reports that she spoke with the patient yesterday and he was planning on coming to her home until the first of the month when he receives his disability check which she was initially amenable to. She was informed that prior to admission the patient reported homicidal ideation against her and her father which the patient has since retracted. She reports that he cannot come to her house anymore after finding this out because she is pregnant and cannot deal with this risk. She cannot come to [facility] for family meeting because she does not have transportation. She also reports that prior to hospitalization the patient was talking to himself in her house and she did not think that he was doing well.
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
## 2017-12-20 NOTE — PLAN OF CARE
Problem: Patient Care Overview (Adult)  Goal: Plan of Care Review  Outcome: Ongoing (interventions implemented as appropriate)  Remains irritable, withdrawn. Minimal interaction with peers and staff. Did not participate in evening group. Currently does not endorse homicidal ideations. No behavioral disturbances during the evening. Medication compliant. Modified visual contact maintained per MD orders.

## 2017-12-20 NOTE — ASSESSMENT & PLAN NOTE
Reports Compliance with Invega Sustenna 156 mg IM last received 12/3 (obtained at [facility])  Denies AH and paranoia    Currently we have been augmenting invega sustenna with invega PO 6mg daily + Seroquel 100mg bedtime.  Ultimately he may need higher dose of invega sustenna.       Continue Paxil 30 mg daily     Xanax discontinued - maintain withdrawal precautions    Haldol + Ativan PRN non redirectable agitation

## 2017-12-21 VITALS
BODY MASS INDEX: 24.36 KG/M2 | TEMPERATURE: 98 F | SYSTOLIC BLOOD PRESSURE: 135 MMHG | RESPIRATION RATE: 16 BRPM | WEIGHT: 179.88 LBS | DIASTOLIC BLOOD PRESSURE: 68 MMHG | HEART RATE: 78 BPM | HEIGHT: 72 IN

## 2017-12-21 PROCEDURE — 90853 GROUP PSYCHOTHERAPY: CPT | Mod: ,,, | Performed by: PSYCHOLOGIST

## 2017-12-21 PROCEDURE — 99238 HOSP IP/OBS DSCHRG MGMT 30/<: CPT | Mod: GC,,, | Performed by: PSYCHIATRY & NEUROLOGY

## 2017-12-21 PROCEDURE — 25000003 PHARM REV CODE 250: Performed by: PSYCHIATRY & NEUROLOGY

## 2017-12-21 RX ORDER — PAROXETINE 30 MG/1
30 TABLET, FILM COATED ORAL DAILY
Qty: 30 TABLET | Refills: 0 | Status: SHIPPED | OUTPATIENT
Start: 2017-12-22

## 2017-12-21 RX ORDER — LOSARTAN POTASSIUM 50 MG/1
50 TABLET ORAL DAILY
Qty: 30 TABLET | Refills: 0 | Status: SHIPPED | OUTPATIENT
Start: 2017-12-22

## 2017-12-21 RX ORDER — PALIPERIDONE 6 MG/1
6 TABLET, EXTENDED RELEASE ORAL DAILY
Qty: 30 TABLET | Refills: 0 | Status: SHIPPED | OUTPATIENT
Start: 2017-12-22 | End: 2017-12-21 | Stop reason: SDUPTHER

## 2017-12-21 RX ORDER — QUETIAPINE FUMARATE 100 MG/1
100 TABLET, FILM COATED ORAL NIGHTLY
Qty: 30 TABLET | Refills: 0 | Status: SHIPPED | OUTPATIENT
Start: 2017-12-21 | End: 2017-12-21 | Stop reason: SDUPTHER

## 2017-12-21 RX ORDER — PALIPERIDONE 6 MG/1
6 TABLET, EXTENDED RELEASE ORAL DAILY
Qty: 30 TABLET | Refills: 0 | Status: SHIPPED | OUTPATIENT
Start: 2017-12-22

## 2017-12-21 RX ORDER — LOSARTAN POTASSIUM 50 MG/1
50 TABLET ORAL DAILY
Qty: 30 TABLET | Refills: 0 | Status: SHIPPED | OUTPATIENT
Start: 2017-12-22 | End: 2017-12-21 | Stop reason: SDUPTHER

## 2017-12-21 RX ORDER — QUETIAPINE FUMARATE 100 MG/1
100 TABLET, FILM COATED ORAL NIGHTLY
Qty: 30 TABLET | Refills: 0 | Status: SHIPPED | OUTPATIENT
Start: 2017-12-21

## 2017-12-21 RX ORDER — FOLIC ACID 1 MG/1
1 TABLET ORAL DAILY
Qty: 30 TABLET | Refills: 0 | Status: SHIPPED | OUTPATIENT
Start: 2017-12-22 | End: 2018-12-22

## 2017-12-21 RX ORDER — PAROXETINE 30 MG/1
30 TABLET, FILM COATED ORAL DAILY
Qty: 30 TABLET | Refills: 0 | Status: SHIPPED | OUTPATIENT
Start: 2017-12-22 | End: 2017-12-21 | Stop reason: SDUPTHER

## 2017-12-21 RX ORDER — NICOTINE 7MG/24HR
1 PATCH, TRANSDERMAL 24 HOURS TRANSDERMAL DAILY
Qty: 30 PATCH | Refills: 0 | Status: SHIPPED | OUTPATIENT
Start: 2017-12-21 | End: 2017-12-25

## 2017-12-21 RX ADMIN — PAROXETINE HYDROCHLORIDE HEMIHYDRATE 30 MG: 30 TABLET, FILM COATED ORAL at 08:12

## 2017-12-21 RX ADMIN — PANTOPRAZOLE SODIUM 40 MG: 40 TABLET, DELAYED RELEASE ORAL at 08:12

## 2017-12-21 RX ADMIN — LOSARTAN POTASSIUM 50 MG: 25 TABLET, FILM COATED ORAL at 08:12

## 2017-12-21 RX ADMIN — FOLIC ACID 1 MG: 1 TABLET ORAL at 08:12

## 2017-12-21 RX ADMIN — PALIPERIDONE 6 MG: 6 TABLET, EXTENDED RELEASE ORAL at 08:12

## 2017-12-21 RX ADMIN — THERA TABS 1 TABLET: TAB at 08:12

## 2017-12-21 NOTE — PROGRESS NOTES
12/20/17 2000   [facility] Group Therapy   Group Name Other  (Social Group)   Specific Interventions Other (see comments)  (Social group wrap-up)   Participation Level None   Participation Quality Sleeping   Insight/Motivation None

## 2017-12-21 NOTE — PROGRESS NOTES
Group Psychotherapy (PhD/LCSW)    Site: [facility]    Clinical status of patient: Inpatient    Date: 12/21/2017    Group Focus: Life Skills    Length of service: 95988 - 35-40 minutes    Number of patients in attendance: 9    Referred by: Acute Psychiatry Unit Treatment Team    Target symptoms: Substance Abuse; Mood d/o; Psychosis    Patient's response to treatment: Active Listening; Self-disclosure     Progress toward goals: Progressing slowly    Interval History:  Pt presented as alert and attentive in group.Pt says he is doing better.  Pt discussed his desire for discharge before Xmas.       Diagnosis: Schizoaffective d/o; Cannabis abuse; Cocaine abuse    Plan: Continue treatment on [facility]

## 2017-12-21 NOTE — PLAN OF CARE
12/21/17 1500   [facility] Group Therapy   Group Name Medication   Participation Level Appropriate;Attentive;Sharing;Supportive   Participation Quality Cooperative   Insight/Motivation Improved   Affect/Mood Display Appropriate   Cognition Alert

## 2017-12-21 NOTE — NURSING
Spoke with Jemma at [facility]'s outpatient program ([phone]). States patient is welcome back into the program and can return on December 26. She asked that when he is d/c'd we fax psych eval, H&P, any labs, psychosocial, D/C papers and current medication list to [phone].

## 2017-12-21 NOTE — ASSESSMENT & PLAN NOTE
Reports Compliance with Invega Sustenna 156 mg IM last received 12/3 (obtained at [facility])  Denies AH and paranoia    Currently we have been augmenting invega sustenna with invega PO 6mg daily + Seroquel 100mg bedtime.  Ultimately he may need higher dose of invega sustenna.       Continue Paxil 30 mg daily     Xanax discontinued - maintain withdrawal precautions    Haldol + Ativan PRN non redirectable agitation

## 2017-12-21 NOTE — NURSING
H&P, D/C summary, psychosocial eval, current medication list, discharge instructions and current labs faxed to [facility].

## 2017-12-21 NOTE — SUBJECTIVE & OBJECTIVE
Interval History: The pt continues to deny SI, HI, AVH, and paranoia    Family History     Problem Relation (Age of Onset)    Hypertension Mother, Father    Stroke Father        Social History Main Topics    Smoking status: Current Every Day Smoker     Packs/day: 1.00     Years: 15.00     Types: Cigarettes    Smokeless tobacco: Never Used    Alcohol use No    Drug use: Yes     Types: Marijuana    Sexual activity: Not on file     Psychotherapeutics     Start     Stop Route Frequency Ordered    12/18/17 1115  LORazepam tablet 2 mg      -- Oral Every 6 hours PRN 12/18/17 1017    12/17/17 2100  QUEtiapine tablet 100 mg      -- Oral Nightly 12/17/17 0851    12/16/17 1215  paliperidone 24 hr tablet 6 mg      -- Oral Daily 12/16/17 1112    12/16/17 0900  paroxetine tablet 30 mg      -- Oral Daily 12/16/17 0415    12/15/17 2313  haloperidol tablet 5 mg  (Med - Acute  Behavioral Management)      -- Oral Every 4 hours PRN 12/15/17 2225    12/15/17 2313  LORazepam tablet 2 mg  (Med - Acute  Behavioral Management)      -- Oral Every 4 hours PRN 12/15/17 2225    12/15/17 2313  haloperidol lactate injection 5 mg  (Med - Acute  Behavioral Management)      -- IM Every 4 hours PRN 12/15/17 2225    12/15/17 2313  lorazepam (ATIVAN) injection 2 mg  (Med - Acute  Behavioral Management)      -- IM Every 4 hours PRN 12/15/17 2225           Review of Systems   Gastrointestinal: Negative for diarrhea and vomiting.   Musculoskeletal: Negative for back pain and neck pain.     Objective:     Vital Signs (Most Recent):  Temp: 97.7 °F (36.5 °C) (12/21/17 0730)  Pulse: 68 (12/21/17 0730)  Resp: 16 (12/21/17 0730)  BP: 120/77 (12/21/17 0730) Vital Signs (24h Range):  Temp:  [97.7 °F (36.5 °C)-98.1 °F (36.7 °C)] 97.7 °F (36.5 °C)  Pulse:  [68-78] 68  Resp:  [16] 16  BP: (120-137)/(70-77) 120/77     Height: 6' (182.9 cm)  Weight: 81.6 kg (179 lb 14.3 oz)  Body mass index is 24.4 kg/m².    No intake or output data in the 24 hours ending  12/21/17 0849    Physical Exam      Mental Status Exam:  Appearance: unremarkable, age appropriate  Behavior/Cooperation:  normal, cooperative  Speech: appropriate rate, volume and tone normal tone, normal rate, normal pitch, normal volume  Language: uses words appropriately; NO aphasia or dysarthria  Mood: steady  Affect:  congruent with mood and appropriate to situation/content   Thought Process: normal and logical  Thought Content: normal, no suicidality, no homicidality, delusions, or paranoia  Level of Consciousness: Alert and Oriented x3  Memory:  Intact   3/3 immediate, 3/3 at 5 minutes    Recent:  Impaired/  Limited/ Intact; able to report recent events   Remote:  Impaired/  Limited/ Intact; Named 4/4 past presidents   Attention/concentration: appropriate for age/education.   Fund of Knowledge: appears adequate  Insight:  Fair  Judgment: Fair    Significant Labs:   Last 24 Hours:   Recent Lab Results     None          Significant Imaging: I have reviewed all pertinent imaging results/findings within the past 24 hours.
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
## 2017-12-21 NOTE — NURSING
Pt given detailed d/c instructions on follow up appointments and medications. Pt denies SI/HI/AV hallucinations. Pt is given national suicide line, told to seek help if SI/HI or other psychotic symptoms return.  Verbalizes  understanding of all instructions. Pt's home medication Genvoya is returned to patient. Pt signs form verifying that he received all belongings that he came in with. Pt will be given all belongings on his way out the door. NAD noted, VSS, pt calm, compliant with bright affect. Awaiting Avis's transpo vehicle.
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
## 2017-12-21 NOTE — PHYSICIAN QUERY
PT Name: Reji Michaels  MR #: [id number]    Physician Query Form - HIV Clarification       CDS: Olivia Guido RN CCDS                  Contact Information: [email or web address]    This form is a permanent document in the medical record.     Query Date: December 21, 2017      By submitting this query, we are merely seeking further clarification of documentation. Please utilize your independent clinical judgment when addressing the question(s) below.    The Medical record contains the following:   Indicators   Supporting Clinical Findings Location in Medical Record   X HIV or AIDS HIV (human immunodeficiency virus infection) H&P    CD4 Count          CD4%        Viral Load      History of Opportunistic Infection      Cancer  Pneumocystis Pneumonia (PCP)  Cytomegalovirus (CMV)  Kaposi Sarcoma      HIV-Related Conditions (e.g. Dementia, Encephalopathy) documented     X Medications Genvoya Po  Atripla Po  Home medication  Home medicaitons   X Other Patient reports compliance with HARRT Therapy  Genvoya continued   RPR, G/C pending in light of high risk behavior          H&P     Please clarify the patients HIV status  Per CDC publication Vol 60 RR-17 https://www.cdc.gov/mmwr/preview/mmwrhtml/qd2155r6.htmRelating to the classification HIV Infection, once a patient is diagnosed with AIDS the diagnosis still stands even if, after treatment, the CD4+ T cell count rises to above 200 per ìL of blood or other AIDS-defining illnesses are cured.       The following are AIDS-Defining Illnesses or HIV-Related Diseases.  Bacterial infections, multiple or recurrent only in children aged <6 years Kaposi sarcoma   Candidiasis of bronchi, trachea, or lungs Lymphoma, Burkitt (or equivalent term)   Candidiasis of esophagus Lymphoma, immunoblastic (or equivalent term)   Cervical cancer, invasive Only among adults, adolescents, and children aged > 6 years. Lymphoma, primary, of brain   Coccidioidomycosis, disseminated or extrapulmonary  Mycobacterium avium complex or Mycobacterium kansasii, disseminated or extrapulmonary   Cryptococcosis, extrapulmonary Mycobacterium tuberculosis of any site, pulmonary, disseminated, or extrapulmonary   Cryptosporidiosis, chronic intestinal (>1 months duration) Mycobacterium, other species or unidentified species, disseminated or extrapulmonary   Cytomegalovirus disease (other than liver, spleen, or nodes), onset at age >1 month Pneumocystis jirovecii (previously known as Pneumocystis carinii) pneumonia   Cytomegalovirus retinitis (with loss of vision) Pneumonia, recurrent Only among adults, adolescents, and children aged .6 years.   Encephalopathy attributed to HIV Progressive multifocal leukoencephalopathy   Herpes simplex: chronic ulcers (>1 months duration) or bronchitis, pneumonitis, or esophagitis (onset at age >1 month) Salmonella septicemia, recurrent   Histoplasmosis, disseminated or extrapulmonary Toxoplasmosis of brain, onset at age >1 month   Isosporiasis, chronic intestinal (>1 months duration) Wasting syndrome attributed to HIV     [ ] Asymptomatic HIV Infection - Positive Status Only - without any history of (or current) AIDS-Defining Illness or HIV-Related Illness  [ ] Other (please specify): ____________________________________  [ ] Clinically Undetermined    Please document in your progress notes daily for the duration of treatment until resolved and include in your discharge summary.

## 2017-12-25 ENCOUNTER — HOSPITAL ENCOUNTER (EMERGENCY)
Facility: HOSPITAL | Age: 37
Discharge: PSYCHIATRIC OR SUBSTANCE USE FACILITY | End: 2017-12-26
Attending: EMERGENCY MEDICINE
Payer: MEDICARE

## 2017-12-25 DIAGNOSIS — R45.850 HOMICIDAL IDEATION: Primary | ICD-10-CM

## 2017-12-25 DIAGNOSIS — R44.0 AUDITORY HALLUCINATIONS: ICD-10-CM

## 2017-12-25 LAB
ALBUMIN SERPL BCP-MCNC: 4.5 G/DL
ALP SERPL-CCNC: 114 U/L
ALT SERPL W/O P-5'-P-CCNC: 136 U/L
AMORPH CRY UR QL COMP ASSIST: ABNORMAL
AMPHET+METHAMPHET UR QL: NEGATIVE
ANION GAP SERPL CALC-SCNC: 15 MMOL/L
APAP SERPL-MCNC: <10 UG/ML
AST SERPL-CCNC: 72 U/L
BACTERIA #/AREA URNS AUTO: ABNORMAL /HPF
BARBITURATES UR QL SCN>200 NG/ML: NEGATIVE
BASOPHILS # BLD AUTO: 0.06 K/UL
BASOPHILS NFR BLD: 1.3 %
BENZODIAZ UR QL SCN>200 NG/ML: NEGATIVE
BILIRUB SERPL-MCNC: 0.2 MG/DL
BILIRUB UR QL STRIP: NEGATIVE
BUN SERPL-MCNC: 14 MG/DL
BZE UR QL SCN: NORMAL
CALCIUM SERPL-MCNC: 9.2 MG/DL
CANNABINOIDS UR QL SCN: NORMAL
CHLORIDE SERPL-SCNC: 101 MMOL/L
CLARITY UR REFRACT.AUTO: CLEAR
CO2 SERPL-SCNC: 27 MMOL/L
COLOR UR AUTO: ABNORMAL
CREAT SERPL-MCNC: 1.24 MG/DL
CREAT UR-MCNC: 166.2 MG/DL
DIFFERENTIAL METHOD: ABNORMAL
EOSINOPHIL # BLD AUTO: 0 K/UL
EOSINOPHIL NFR BLD: 0.9 %
ERYTHROCYTE [DISTWIDTH] IN BLOOD BY AUTOMATED COUNT: 14 %
EST. GFR  (AFRICAN AMERICAN): >60 ML/MIN/1.73 M^2
EST. GFR  (NON AFRICAN AMERICAN): >60 ML/MIN/1.73 M^2
ETHANOL SERPL-MCNC: 13 MG/DL
GLUCOSE SERPL-MCNC: 129 MG/DL
GLUCOSE UR QL STRIP: NEGATIVE
HCT VFR BLD AUTO: 33.5 %
HGB BLD-MCNC: 11.5 G/DL
HGB UR QL STRIP: NEGATIVE
HYALINE CASTS UR QL AUTO: >100 /LPF
HYPOCHROMIA BLD QL SMEAR: ABNORMAL
KETONES UR QL STRIP: NEGATIVE
LEUKOCYTE ESTERASE UR QL STRIP: ABNORMAL
LYMPHOCYTES # BLD AUTO: 1.7 K/UL
LYMPHOCYTES NFR BLD: 38.5 %
MCH RBC QN AUTO: 27.3 PG
MCHC RBC AUTO-ENTMCNC: 34.3 G/DL
MCV RBC AUTO: 80 FL
METHADONE UR QL SCN>300 NG/ML: NEGATIVE
MICROSCOPIC COMMENT: ABNORMAL
MONOCYTES # BLD AUTO: 0.4 K/UL
MONOCYTES NFR BLD: 8.5 %
NEUTROPHILS # BLD AUTO: 2.3 K/UL
NEUTROPHILS NFR BLD: 50.8 %
NITRITE UR QL STRIP: NEGATIVE
NON-SQ EPI CELLS #/AREA URNS AUTO: ABNORMAL /HPF
OPIATES UR QL SCN: NEGATIVE
OVALOCYTES BLD QL SMEAR: ABNORMAL
PCP UR QL SCN>25 NG/ML: NEGATIVE
PH UR STRIP: 6 [PH] (ref 5–8)
PLATELET # BLD AUTO: 201 K/UL
PLATELET BLD QL SMEAR: ABNORMAL
PMV BLD AUTO: 8.1 FL
POIKILOCYTOSIS BLD QL SMEAR: SLIGHT
POLYCHROMASIA BLD QL SMEAR: ABNORMAL
POTASSIUM SERPL-SCNC: 3.3 MMOL/L
PROT SERPL-MCNC: 8.1 G/DL
PROT UR QL STRIP: ABNORMAL
RBC # BLD AUTO: 4.21 M/UL
RBC #/AREA URNS AUTO: 4 /HPF (ref 0–4)
SALICYLATES SERPL-MCNC: <5 MG/DL
SODIUM SERPL-SCNC: 143 MMOL/L
SP GR UR STRIP: 1.01 (ref 1–1.03)
SQUAMOUS #/AREA URNS AUTO: ABNORMAL /HPF
STOMATOCYTES BLD QL SMEAR: PRESENT
TARGETS BLD QL SMEAR: ABNORMAL
TOXICOLOGY INFORMATION: NORMAL
URN SPEC COLLECT METH UR: ABNORMAL
UROBILINOGEN UR STRIP-ACNC: 1 EU/DL
WBC # BLD AUTO: 4.49 K/UL
WBC #/AREA URNS AUTO: 40 /HPF (ref 0–5)

## 2017-12-25 PROCEDURE — 99285 EMERGENCY DEPT VISIT HI MDM: CPT

## 2017-12-25 PROCEDURE — 80053 COMPREHEN METABOLIC PANEL: CPT

## 2017-12-25 PROCEDURE — 80307 DRUG TEST PRSMV CHEM ANLYZR: CPT

## 2017-12-25 PROCEDURE — 80329 ANALGESICS NON-OPIOID 1 OR 2: CPT

## 2017-12-25 PROCEDURE — 80320 DRUG SCREEN QUANTALCOHOLS: CPT

## 2017-12-25 PROCEDURE — 85025 COMPLETE CBC W/AUTO DIFF WBC: CPT

## 2017-12-25 PROCEDURE — G0425 INPT/ED TELECONSULT30: HCPCS | Mod: GT,,, | Performed by: PSYCHIATRY & NEUROLOGY

## 2017-12-25 PROCEDURE — 81000 URINALYSIS NONAUTO W/SCOPE: CPT

## 2017-12-25 RX ORDER — ALPRAZOLAM 1 MG/1
1 TABLET ORAL 3 TIMES DAILY PRN
COMMUNITY

## 2017-12-25 RX ORDER — PROMETHAZINE HYDROCHLORIDE 25 MG/1
25 TABLET ORAL EVERY 4 HOURS
COMMUNITY

## 2017-12-26 VITALS
DIASTOLIC BLOOD PRESSURE: 68 MMHG | BODY MASS INDEX: 25.9 KG/M2 | HEIGHT: 71 IN | HEART RATE: 75 BPM | WEIGHT: 185 LBS | RESPIRATION RATE: 20 BRPM | OXYGEN SATURATION: 98 % | SYSTOLIC BLOOD PRESSURE: 132 MMHG | TEMPERATURE: 98 F

## 2017-12-26 NOTE — ED PROVIDER NOTES
"Encounter Date: 12/25/2017       History     Chief Complaint   Patient presents with    Psychiatric Evaluation     Hearing voices - patient states he needs Invega his shot and it needs to be increased.  When asked if he is having SI/HI, patient states "I cant cope with this and deal with my familiy.  This is just the devil."     The history is provided by the patient.   Mental Health Problem   The primary symptoms include dysphoric mood. The current episode started today. This is a chronic problem.   The onset of the illness is precipitated by emotional stress. The degree of incapacity that he is experiencing as a consequence of his illness is severe. Sequelae of the illness include an inability to work, harmed interpersonal relations and an inability to care for self. Additional symptoms of the illness include agitation, inflated self-esteem and poor judgment. Additional symptoms of the illness do not include feelings of worthlessness, increased goal-directed activity, flight of ideas or seizures. He does not admit to suicidal ideas. He does not have a plan to commit suicide. He does not contemplate harming himself. He has not already injured self. He contemplates injuring another person. He has not already  injured another person. Risk factors that are present for mental illness include a history of mental illness.     Review of patient's allergies indicates:   Allergen Reactions    Shellfish containing products Itching and Swelling     Past Medical History:   Diagnosis Date    Anxiety     Asthma     Bipolar 1 disorder     Depression     Hep C w/o coma, chronic     HIV (human immunodeficiency virus infection)     HTN (hypertension)     Schizoaffective disorder, bipolar type      Past Surgical History:   Procedure Laterality Date    APPENDECTOMY      HERNIA REPAIR       Family History   Problem Relation Age of Onset    Hypertension Mother     Stroke Father     Hypertension Father      Social History "   Substance Use Topics    Smoking status: Former Smoker     Packs/day: 1.00     Years: 15.00     Types: Cigarettes    Smokeless tobacco: Never Used      Comment: pt states he quit last hospitalization    Alcohol use No     Review of Systems   Neurological: Negative for seizures.   Psychiatric/Behavioral: Positive for agitation and dysphoric mood. Negative for suicidal ideas.   All other systems reviewed and are negative.      Physical Exam     Initial Vitals [12/25/17 1922]   BP Pulse Resp Temp SpO2   (!) 111/55 (!) 116 20 97.6 °F (36.4 °C) 97 %      MAP       73.67         Physical Exam    Nursing note and vitals reviewed.  Constitutional: He appears well-developed and well-nourished.   HENT:   Head: Normocephalic and atraumatic.   Eyes: Conjunctivae and EOM are normal.   Neck: Normal range of motion. Neck supple.   Cardiovascular: Regular rhythm, normal heart sounds and intact distal pulses.   Pulmonary/Chest: Breath sounds normal.   Abdominal: Soft.   Musculoskeletal: Normal range of motion.   Neurological: He is alert and oriented to person, place, and time.   Skin: Skin is warm and dry. Capillary refill takes less than 2 seconds.   Psychiatric: His mood appears anxious. His affect is angry and inappropriate. His speech is rapid and/or pressured. He is aggressive and combative. Thought content is paranoid and delusional. He expresses impulsivity and inappropriate judgment. He expresses homicidal ideation. He expresses no suicidal ideation. He expresses no homicidal plans.         ED Course   Procedures  Labs Reviewed - No data to display             Additional MDM:   Psych: A Physician Emergency Certificate (PEC) was done in the ED for: Homicidal. The patient has been medically cleared. Outcome: The patient was approved for transfer to another facility.                 ED Course      Clinical Impression:   The primary encounter diagnosis was Homicidal ideation. A diagnosis of Auditory hallucinations was also  pertinent to this visit.    Disposition:   Disposition: Transferred  Condition: Stable                        Eladia Chapman MD  12/25/17 2330       Eladia Chapman MD  12/25/17 2331       Eladia Chapman MD  12/29/17 0610
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
## 2017-12-26 NOTE — ED NOTES
Call from Nicole of [facility], patient is accepted to that facility to the services of Dr. Peoples.. Report is to be called to [phone]. Nicole lea has spoken directly to the staff @ [facility].

## 2017-12-26 NOTE — ED NOTES
PEC received in [facility]. Will actively seek placement upon pt medical clearance.

## 2017-12-26 NOTE — ED NOTES
"Pt made aware that he has to give a urine specimen. Pt states, "I can't just stand up and make myself go to the bathroom. My bladder has to be full for me to go."  "

## 2017-12-26 NOTE — ED NOTES
"Made pt aware that he needs to urinate or try to urinate. Pt states, "I will urinate when I have to urinate ma'am. I know my body and I can't just go into the bathroom and urinate."   "

## 2017-12-26 NOTE — ED NOTES
[facility] with another patient at this time and would call back to receive report.

## 2017-12-26 NOTE — ED NOTES
Pt's belongings: cell phone, cell phone , cross necklace (broken), ID card, food stamp card, debit card, shoes (black), pants (blue jeans), undershirt (gray), long sleeve shirt (burgundy), jacket (gray), hat (burgundy), socks (burgundy). Placed in bag with patient label and locked up.

## 2017-12-27 NOTE — PHYSICIAN QUERY
PT Name: Reji Michaels  MR #: [id number]    Physician Query Form - HIV Clarification       CDS: Olivia Guido RN CCDS                  Contact Information: [email or web address]    This form is a permanent document in the medical record.     Query Date: December 27, 2017      By submitting this query, we are merely seeking further clarification of documentation. Please utilize your independent clinical judgment when addressing the question(s) below.    The Medical record contains the following:   Indicators   Supporting Clinical Findings Location in Medical Record   X HIV or AIDS HIV (human immunodeficiency virus infection)    HIV (human immunodeficiency virus infection) [B20] H&P        Discharge summary 12/21   X CD4 Count          CD4%        Viral Load CD4 count= 1328 LAB 12/18    History of Opportunistic Infection      Cancer  Pneumocystis Pneumonia (PCP)  Cytomegalovirus (CMV)  Kaposi Sarcoma      HIV-Related Conditions (e.g. Dementia, Encephalopathy) documented     X Medications Genvoya Po  Atripla Po  Home medication  Home medicaitons   X Other Patient reports compliance with HARRT Therapy  Genvoya continued   RPR, G/C pending in light of high risk behavior          H&P     Please clarify the patients HIV status  Per CDC publication Vol 60 RR-17 https://www.cdc.gov/mmwr/preview/mmwrhtml/rb2868j1.htmRelating to the classification HIV Infection, once a patient is diagnosed with AIDS the diagnosis still stands even if, after treatment, the CD4+ T cell count rises to above 200 per ìL of blood or other AIDS-defining illnesses are cured.       The following are AIDS-Defining Illnesses or HIV-Related Diseases.  Bacterial infections, multiple or recurrent only in children aged <6 years Kaposi sarcoma   Candidiasis of bronchi, trachea, or lungs Lymphoma, Burkitt (or equivalent term)   Candidiasis of esophagus Lymphoma, immunoblastic (or equivalent term)   Cervical cancer, invasive Only among adults, adolescents, and  children aged > 6 years. Lymphoma, primary, of brain   Coccidioidomycosis, disseminated or extrapulmonary Mycobacterium avium complex or Mycobacterium kansasii, disseminated or extrapulmonary   Cryptococcosis, extrapulmonary Mycobacterium tuberculosis of any site, pulmonary, disseminated, or extrapulmonary   Cryptosporidiosis, chronic intestinal (>1 months duration) Mycobacterium, other species or unidentified species, disseminated or extrapulmonary   Cytomegalovirus disease (other than liver, spleen, or nodes), onset at age >1 month Pneumocystis jirovecii (previously known as Pneumocystis carinii) pneumonia   Cytomegalovirus retinitis (with loss of vision) Pneumonia, recurrent Only among adults, adolescents, and children aged .6 years.   Encephalopathy attributed to HIV Progressive multifocal leukoencephalopathy   Herpes simplex: chronic ulcers (>1 months duration) or bronchitis, pneumonitis, or esophagitis (onset at age >1 month) Salmonella septicemia, recurrent   Histoplasmosis, disseminated or extrapulmonary Toxoplasmosis of brain, onset at age >1 month   Isosporiasis, chronic intestinal (>1 months duration) Wasting syndrome attributed to HIV     [ ] Asymptomatic HIV Infection - Positive Status Only - without any history of (or current) AIDS-Defining Illness or HIV-Related Illness  [ ] Other (please specify): ____________________________________  [x] Clinically Undetermined    Please document in your progress notes daily for the duration of treatment until resolved and include in your discharge summary.

## 2020-06-08 NOTE — PROGRESS NOTES
Group Psychotherapy (PhD/LCSW)    Site: [facility]    Clinical status of patient: Inpatient    Date: 12/20/2017    Group Focus: Life Skills    Length of service: 40617 - 35-40 minutes    Number of patients in attendance: 8    Referred by: Acute Psychiatry Unit Treatment Team    Target symptoms: Substance Abuse; Mood d/o; Psychosis    Patient's response to treatment: Active Listening; Self-disclosure    Progress towards goals: Progressing slowly    Interval History:  Pt presented as alert and attentive in group. Pt discussed his hx of panic attacks and his frustration at not being able to control them.        Diagnosis: Schizoaffective d/o; Cannabis abuse; Cocaine abuse    Plan: Continue treatment on [facility]         soft/bowel sounds normal/nontender